# Patient Record
Sex: FEMALE | URBAN - METROPOLITAN AREA
[De-identification: names, ages, dates, MRNs, and addresses within clinical notes are randomized per-mention and may not be internally consistent; named-entity substitution may affect disease eponyms.]

---

## 2017-02-06 ENCOUNTER — HOSPITAL ENCOUNTER (OUTPATIENT)
Dept: LAB | Age: 82
Discharge: HOME | End: 2017-02-06
Payer: MEDICARE

## 2017-02-06 DIAGNOSIS — D50.9 IRON DEFICIENCY ANEMIA, UNSPECIFIED IRON DEFICIENCY ANEMIA TYPE: ICD-10-CM

## 2017-02-06 LAB
BASOPHILS # BLD AUTO: 0 K/UL (ref 0–0.06)
BASOPHILS # BLD: 0 % (ref 0–2)
DIFFERENTIAL METHOD BLD: ABNORMAL
EOSINOPHIL # BLD: 0.2 K/UL (ref 0–0.4)
EOSINOPHIL NFR BLD: 2 % (ref 0–5)
ERYTHROCYTE [DISTWIDTH] IN BLOOD BY AUTOMATED COUNT: 23.9 % (ref 11.6–14.5)
HCT VFR BLD AUTO: 38.8 % (ref 35–45)
HGB BLD-MCNC: 11.7 G/DL (ref 12–16)
LYMPHOCYTES # BLD AUTO: 16 % (ref 21–52)
LYMPHOCYTES # BLD: 1.6 K/UL (ref 0.9–3.6)
MCH RBC QN AUTO: 24 PG (ref 24–34)
MCHC RBC AUTO-ENTMCNC: 30.2 G/DL (ref 31–37)
MCV RBC AUTO: 79.7 FL (ref 74–97)
MONOCYTES # BLD: 0.9 K/UL (ref 0.05–1.2)
MONOCYTES NFR BLD AUTO: 9 % (ref 3–10)
NEUTS SEG # BLD: 7.2 K/UL (ref 1.8–8)
NEUTS SEG NFR BLD AUTO: 73 % (ref 40–73)
PLATELET # BLD AUTO: 267 K/UL (ref 135–420)
RBC # BLD AUTO: 4.87 M/UL (ref 4.2–5.3)
WBC # BLD AUTO: 9.9 K/UL (ref 4.6–13.2)

## 2017-02-06 PROCEDURE — 36415 COLL VENOUS BLD VENIPUNCTURE: CPT | Performed by: INTERNAL MEDICINE

## 2017-02-06 PROCEDURE — 85025 COMPLETE CBC W/AUTO DIFF WBC: CPT | Performed by: INTERNAL MEDICINE

## 2017-02-07 NOTE — PROGRESS NOTES
Spoke with patient and gave resulted note. Patient stated she is going good  Sleeping good and eating better.

## 2017-05-01 RX ORDER — DILTIAZEM HYDROCHLORIDE 240 MG/1
CAPSULE, EXTENDED RELEASE ORAL
Qty: 90 CAP | Refills: 2 | Status: SHIPPED | OUTPATIENT
Start: 2017-05-01 | End: 2017-10-16 | Stop reason: SDUPTHER

## 2017-07-10 ENCOUNTER — OFFICE VISIT (OUTPATIENT)
Dept: FAMILY MEDICINE CLINIC | Age: 82
End: 2017-07-10

## 2017-07-10 VITALS
TEMPERATURE: 97.8 F | BODY MASS INDEX: 28.32 KG/M2 | HEART RATE: 82 BPM | SYSTOLIC BLOOD PRESSURE: 132 MMHG | HEIGHT: 65 IN | OXYGEN SATURATION: 94 % | WEIGHT: 170 LBS | RESPIRATION RATE: 18 BRPM | DIASTOLIC BLOOD PRESSURE: 48 MMHG

## 2017-07-10 DIAGNOSIS — E03.9 ACQUIRED HYPOTHYROIDISM: ICD-10-CM

## 2017-07-10 DIAGNOSIS — I10 ESSENTIAL HYPERTENSION: ICD-10-CM

## 2017-07-10 DIAGNOSIS — J45.909 ASTHMATIC BRONCHITIS, UNSPECIFIED ASTHMA SEVERITY, UNCOMPLICATED: ICD-10-CM

## 2017-07-10 DIAGNOSIS — D50.9 IRON DEFICIENCY ANEMIA, UNSPECIFIED IRON DEFICIENCY ANEMIA TYPE: Primary | ICD-10-CM

## 2017-07-10 LAB — HGB BLD-MCNC: 9.8 G/DL

## 2017-07-10 RX ORDER — DEXAMETHASONE SODIUM PHOSPHATE 4 MG/ML
4 INJECTION, SOLUTION INTRA-ARTICULAR; INTRALESIONAL; INTRAMUSCULAR; INTRAVENOUS; SOFT TISSUE ONCE
Qty: 1 VIAL | Refills: 0
Start: 2017-07-10 | End: 2017-07-10

## 2017-07-10 RX ORDER — AZITHROMYCIN 250 MG/1
TABLET, FILM COATED ORAL
Qty: 6 TAB | Refills: 0 | Status: SHIPPED | OUTPATIENT
Start: 2017-07-10 | End: 2017-10-16 | Stop reason: ALTCHOICE

## 2017-07-10 NOTE — PROGRESS NOTES
Per verbal orders of , injection of dexamethasone 4mg given by Mango Calhoun LPN. Patient instructed to remain in clinic for 20 minutes afterwards, and to report any adverse reaction to me immediately. Medication documentation completed. Tolerated well.

## 2017-07-10 NOTE — PROGRESS NOTES
Chief Complaint   Patient presents with    Cough    Anemia     follow up    Hypertension     follow up     Pain scale 0/10        1. Have you been to the ER, urgent care clinic since your last visit? Hospitalized since your last visit? No    2. Have you seen or consulted any other health care providers outside of the [address] [place] [address] since your last visit? Include any pap smears or colon screening.  No

## 2017-07-10 NOTE — MR AVS SNAPSHOT
Visit Information Date & Time Provider Department Dept. Phone Encounter #  
 7/10/2017 10:30 AM Alcira Mamta, 3 [facility] [phone] [id number] Follow-up Instructions Return in about 3 months (around 10/10/2017). Upcoming Health Maintenance Date Due DTaP/Tdap/Td series (1 - Tdap) 4/23/1944 MEDICARE YEARLY EXAM 8/10/2016 GLAUCOMA SCREENING Q2Y 12/9/2016 INFLUENZA AGE 9 TO ADULT 8/1/2017 Allergies as of 7/10/2017  Review Complete On: 7/10/2017 By: Alcira Oleary MD  
 No Known Allergies Current Immunizations  Never Reviewed No immunizations on file. Not reviewed this visit You Were Diagnosed With   
  
 Codes Comments Iron deficiency anemia, unspecified iron deficiency anemia type    -  Primary ICD-10-CM: D50.9 ICD-9-CM: 280.9 Asthmatic bronchitis, unspecified asthma severity, uncomplicated     SGO-61-PC: J45.909 ICD-9-CM: 493.90 Acquired hypothyroidism     ICD-10-CM: E03.9 ICD-9-CM: 244.9 Essential hypertension     ICD-10-CM: I10 
ICD-9-CM: 401.9 Vitals BP Pulse Temp Resp Height(growth percentile) Weight(growth percentile) 132/48 (BP 1 Location: Left arm, BP Patient Position: Sitting) 82 97.8 °F (36.6 °C) (Oral) 18 5' 5\" (1.651 m) 170 lb (77.1 kg) SpO2 BMI OB Status 94% 28.29 kg/m2 Postmenopausal     
  
BMI and BSA Data Body Mass Index Body Surface Area  
 28.29 kg/m 2 1.88 m 2 Preferred Pharmacy Pharmacy Name Phone [facility], [address] [place] [address] [phone] Your Updated Medication List  
  
   
This list is accurate as of: 7/10/17 10:55 AM.  Always use your most recent med list.  
  
  
  
  
 aspirin 81 mg chewable tablet Take 81 mg by mouth daily. azithromycin 250 mg tablet Commonly known as:  Neal Gist Take as directed  
  
 dexamethasone 4 mg/mL injection Commonly known as:  DECADRON  
 1 mL by IntraMUSCular route once for 1 dose. * dilTIAZem  mg Tb24 tablet Commonly known as:  CARDIZEM LA Take 1 Tab by mouth daily. * dilTIAZem  mg XR capsule Commonly known as:  DILACOR XR  
TAKE ONE CAPSULE BY MOUTH ONE TIME DAILY ferrous sulfate, dried 159 mg (45 mg iron) Tber tablet Commonly known as:  SLOW RELEASE IRON Take 1 Tab by mouth daily. guaiFENesin-dextromethorphan -30 mg per tablet Commonly known as:  Nick & Nick DM Take 1 Tab by mouth two (2) times a day. levothyroxine 100 mcg tablet Commonly known as:  SYNTHROID Take 1 Tab by mouth Daily (before breakfast). Brand only * OTHER Shoe lift,right foot * OTHER Gel mattress overlay PERCOGESIC 12.5-325 mg Tab Generic drug:  diphenhydrAMINE-Acetaminophen Take  by mouth. rosuvastatin 5 mg tablet Commonly known as:  CRESTOR Take 1 Tab by mouth nightly. senna 8.6 mg tablet Commonly known as:  Senna Once or twice daily with 8 ounces liquid  
  
 silver sulfADIAZINE 1 % topical cream  
Commonly known as:  SILVADENE Apply  to affected area two (2) times a day. VITAMIN D3 1,000 unit tablet Generic drug:  cholecalciferol Take  by mouth daily. * Notice: This list has 4 medication(s) that are the same as other medications prescribed for you. Read the directions carefully, and ask your doctor or other care provider to review them with you. Prescriptions Sent to Pharmacy Refills  
 azithromycin (ZITHROMAX) 250 mg tablet 0 Sig: Take as directed Class: Normal  
 Pharmacy: [address], [address] [place] [address] Ph #: [phone] We Performed the Following AMB POC HEMOGLOBIN (HGB) [44722 CPT(R)] DEXAMETHASONE SODIUM PHOSPHATE INJECTION 1 MG [ [facility]] CT THER/PROPH/DIAG INJECTION, SUBCUT/IM [id number] CPT(R)] REFERRAL TO [facility] PROGRAMS [NRN957 Custom] Comments: patient has been referred into the [facility] Programs indicated above. She is currently being managed for the following chronic conditions: has Nonspecific abnormal findings on radiological and examination of gastrointestinal tract, Nonspecific (abnormal) findings on radiological and other examination of biliary tract, Disorder of adrenal gland ([address] Backache, Cholelithiasis, Abnormal coagulation profile, Fever, Hepatomegaly, Abnormal glucose, Essential hypertension, Hypothyroidism, Leukocytosis, and Systemic inflammatory response syndrome (SIRS) (HCC) on her problem list.  
  
Follow-up Instructions Return in about 3 months (around 10/10/2017). Referral Information Referral ID Referred By Referred To  
  
 [id number] Neil Lynch Not Available Visits Status Start Date End Date 1 New Request 7/10/17 7/10/18 If your referral has a status of pending review or denied, additional information will be sent to support the outcome of this decision. Introducing [facility] & HEALTH SERVICES! [employer] introduces BURLES[employer]ICEOUS patient portal. Now you can access parts of your medical record, email your doctor's office, and request medication refills online. 1. In your internet browser, go to [email or web address] 2. Click on the First Time User? Click Here link in the Sign In box. You will see the New Member Sign Up page. 3. Enter your BURLESQUICEOUS Access Code exactly as it appears below. You will not need to use this code after youve completed the sign-up process. If you do not sign up before the expiration date, you must request a new code. · [employer]URL[employer] Access Code: [email or web address] Expires: 10/8/2017 10:55 AM 
 
4. Enter the last four digits of your Social Security Number (xxxx) and Date of Birth (mm/dd/yyyy) as indicated and click Submit. You will be taken to the next sign-up page. 5. Create a BURLESQUICEOUS ID.  This will be your BURLESQUICEOUS login ID and cannot be changed, so think of one that is secure and easy to remember. 6. Create a [employer] password. You can change your password at any time. 7. Enter your Password Reset Question and Answer. This can be used at a later time if you forget your password. 8. Enter your e-mail address. You will receive e-mail notification when new information is available in [address] 9. Click Sign Up. You can now view and download portions of your medical record. 10. Click the Download Summary menu link to download a portable copy of your medical information. If you have questions, please visit the Frequently Asked Questions section of the [employer] website. Remember, [employer] is NOT to be used for urgent needs. For medical emergencies, dial 911. Now available from your iPhone and Android! Please provide this summary of care documentation to your next provider. Your primary care clinician is listed as Yuri Watts. If you have any questions after today's visit, please call [phone].

## 2017-10-16 ENCOUNTER — OFFICE VISIT (OUTPATIENT)
Dept: FAMILY MEDICINE CLINIC | Age: 82
End: 2017-10-16

## 2017-10-16 VITALS
WEIGHT: 170 LBS | TEMPERATURE: 97 F | DIASTOLIC BLOOD PRESSURE: 67 MMHG | HEIGHT: 65 IN | SYSTOLIC BLOOD PRESSURE: 133 MMHG | HEART RATE: 74 BPM | BODY MASS INDEX: 28.32 KG/M2 | OXYGEN SATURATION: 98 % | RESPIRATION RATE: 18 BRPM

## 2017-10-16 DIAGNOSIS — Z00.00 ROUTINE GENERAL MEDICAL EXAMINATION AT A HEALTH CARE FACILITY: ICD-10-CM

## 2017-10-16 DIAGNOSIS — D64.9 CHRONIC ANEMIA: ICD-10-CM

## 2017-10-16 DIAGNOSIS — E03.9 ACQUIRED HYPOTHYROIDISM: ICD-10-CM

## 2017-10-16 DIAGNOSIS — R63.4 WEIGHT LOSS: ICD-10-CM

## 2017-10-16 DIAGNOSIS — E78.5 HYPERLIPIDEMIA, UNSPECIFIED HYPERLIPIDEMIA TYPE: Primary | ICD-10-CM

## 2017-10-16 RX ORDER — LEVOTHYROXINE SODIUM 100 UG/1
100 TABLET ORAL
Qty: 90 TAB | Refills: 3 | Status: SHIPPED | OUTPATIENT
Start: 2017-10-16 | End: 2018-12-31 | Stop reason: SDUPTHER

## 2017-10-16 RX ORDER — ROSUVASTATIN CALCIUM 5 MG/1
5 TABLET, COATED ORAL
Qty: 90 TAB | Refills: 3 | Status: SHIPPED | OUTPATIENT
Start: 2017-10-16 | End: 2019-01-01 | Stop reason: SDUPTHER

## 2017-10-16 RX ORDER — DILTIAZEM HYDROCHLORIDE 240 MG/1
240 CAPSULE, EXTENDED RELEASE ORAL DAILY
Qty: 90 CAP | Refills: 3 | Status: SHIPPED | OUTPATIENT
Start: 2017-10-16 | End: 2018-11-02 | Stop reason: ALTCHOICE

## 2017-10-16 NOTE — PROGRESS NOTES
Santa Zepeda is a 80 y.o. female (: 1923) presenting to address:[facility]    Chief Complaint   Patient presents with    Annual Wellness Visit            Vitals:    10/16/17 0946   BP: 133/67   Pulse: 74   Resp: 18   Temp: 97 °F (36.1 °C)   TempSrc: Oral   SpO2: 98%   Weight: 170 lb (77.1 kg)   Height: 5' 5\" (1.651 m)   PainSc:   0 - No pain       Hearing/Vision:   No exam data present    Learning Assessment:     Learning Assessment 10/3/2014   PRIMARY LEARNER Patient   HIGHEST LEVEL OF EDUCATION - PRIMARY LEARNER  GRADUATED HIGH SCHOOL OR GED   BARRIERS PRIMARY LEARNER NONE   PRIMARY LANGUAGE ENGLISH   LEARNER PREFERENCE PRIMARY DEMONSTRATION   ANSWERED BY PATIENT   RELATIONSHIP OTHER     Depression Screening:     PHQ over the last two weeks 10/16/2017   Little interest or pleasure in doing things Not at all   Feeling down, depressed or hopeless Not at all   Total Score PHQ 2 0     Fall Risk Assessment:     Fall Risk Assessment, last 12 mths 10/16/2017   Able to walk? Yes   Fall in past 12 months? No     Abuse Screening:     Abuse Screening Questionnaire 10/16/2017   Do you ever feel afraid of your partner? N   Are you in a relationship with someone who physically or mentally threatens you? N   Is it safe for you to go home? Y     Coordination of Care Questionaire:   1. Have you been to the ER, urgent care clinic since your last visit? Hospitalized since your last visit? no    2. Have you seen or consulted any other health care providers outside of the [address] [place] [address] since your last visit? Include any pap smears or colon screening. no    Advanced Directive:   1. Do you have an Advanced Directive? no    2. Would you like information on Advanced Directives? No    Patient declined flu vaccine.

## 2017-10-16 NOTE — ACP (ADVANCE CARE PLANNING)
Advance Care Planning    Advance Care Planning (ACP) Provider Conversation Snapshot    Date of ACP Conversation: 10/16/17  Persons included in Conversation:  patient and family  Length of ACP Conversation in minutes:  25 minutes    Authorized Decision Maker (if patient is incapable of making informed decisions):    This person is:   Healthcare Agent/Medical Power of  under Advance Directive          For Patients with Decision Making Capacity:   Values/Goals: Exploration of values, goals, and preferences if recovery is not expected, even with continued medical treatment in the event of:  Imminent death  Severe, permanent brain injury    Conversation Outcomes / Follow-Up Plan:   Recommended completion of Advance Directive form after review of ACP materials and conversation with prospective healthcare agent    ACP reviewed  Info given  ~ 18 minutes

## 2017-10-16 NOTE — MR AVS SNAPSHOT
Visit Information Date & Time Provider Department Dept. Phone Encounter #  
 10/16/2017  9:30 AM Reena Inge, 3 [facility] [phone] [id number] Follow-up Instructions Return in about 6 months (around 4/16/2018). Follow-up and Disposition History Upcoming Health Maintenance Date Due  
 MEDICARE YEARLY EXAM 10/17/2018 GLAUCOMA SCREENING Q2Y 10/16/2019 DTaP/Tdap/Td series (2 - Td) 10/16/2027 Allergies as of 10/16/2017  Review Complete On: 10/16/2017 By: Reena Alcazar MD  
 No Known Allergies Current Immunizations  Never Reviewed No immunizations on file. Not reviewed this visit You Were Diagnosed With   
  
 Codes Comments Hyperlipidemia, unspecified hyperlipidemia type    -  Primary ICD-10-CM: E78.5 ICD-9-CM: 272.4 Acquired hypothyroidism     ICD-10-CM: E03.9 ICD-9-CM: 451. 9 Chronic anemia     ICD-10-CM: D64.9 ICD-9-CM: 285.9 Weight loss     ICD-10-CM: R63.4 ICD-9-CM: 783.21 Routine general medical examination at a health care facility     ICD-10-CM: Z00.00 ICD-9-CM: V70.0 Vitals BP Pulse Temp Resp Height(growth percentile) Weight(growth percentile) 133/67 (BP 1 Location: Left arm, BP Patient Position: Sitting) 74 97 °F (36.1 °C) (Oral) 18 5' 5\" (1.651 m) 170 lb (77.1 kg) SpO2 BMI OB Status Smoking Status 98% 28.29 kg/m2 Postmenopausal Never Smoker BMI and BSA Data Body Mass Index Body Surface Area  
 28.29 kg/m 2 1.88 m 2 Preferred Pharmacy [facility] Name Phone [facility], [address] [place] [address] [phone] Your Updated Medication List  
  
   
This list is accurate as of: 10/16/17 10:15 AM.  Always use your most recent med list.  
  
  
  
  
 aspirin 81 mg chewable tablet Take 81 mg by mouth daily. * dilTIAZem  mg Tb24 tablet Commonly known as:  CARDIZEM LA Take 1 Tab by mouth daily. * dilTIAZem  mg XR capsule Commonly known as:  DILACOR XR Take 1 Cap by mouth daily. ferrous sulfate, dried 159 mg (45 mg iron) Tber tablet Commonly known as:  SLOW RELEASE IRON Take 1 Tab by mouth daily. guaiFENesin-dextromethorphan -30 mg per tablet Commonly known as:  [address] DM Take 1 Tab by mouth two (2) times a day. levothyroxine 100 mcg tablet Commonly known as:  SYNTHROID Take 1 Tab by mouth Daily (before breakfast). Brand only * OTHER Shoe lift,right foot * OTHER Gel mattress overlay PERCOGESIC 12.5-325 mg Tab Generic drug:  diphenhydrAMINE-Acetaminophen Take  by mouth. rosuvastatin 5 mg tablet Commonly known as:  CRESTOR Take 1 Tab by mouth nightly. senna 8.6 mg tablet Commonly known as:  Senna Once or twice daily with 8 ounces liquid  
  
 silver sulfADIAZINE 1 % topical cream  
Commonly known as:  SILVADENE Apply  to affected area two (2) times a day. VITAMIN D3 1,000 unit tablet Generic drug:  cholecalciferol Take  by mouth daily. * Notice: This list has 4 medication(s) that are the same as other medications prescribed for you. Read the directions carefully, and ask your doctor or other care provider to review them with you. Prescriptions Sent to Pharmacy Refills  
 levothyroxine (SYNTHROID) 100 mcg tablet 3 Sig: Take 1 Tab by mouth Daily (before breakfast). Brand only Class: Normal  
 Pharmacy: [address] Ph #: [phone] Route: Oral  
 rosuvastatin (CRESTOR) 5 mg tablet 3 Sig: Take 1 Tab by mouth nightly. Class: Normal  
 Pharmacy: [address] Ph #: [phone] Route: Oral  
 dilTIAZem XR (DILACOR XR) 240 mg XR capsule 3 Sig: Take 1 Cap by mouth daily. Class: Normal  
 Pharmacy: [address] Ph #: [phone] Route: Oral  
  
Follow-up Instructions Return in about 6 months (around 4/16/2018). To-Do List   
 10/16/2017 Lab:  CBC WITH AUTOMATED DIFF   
  
 10/16/2017 Lab:  LIPID PANEL   
  
 10/16/2017 Lab:  METABOLIC PANEL, COMPREHENSIVE   
  
 10/16/2017 Lab:  T4, FREE   
  
 10/16/2017 Lab:  TSH 3RD GENERATION Patient Instructions Medicare Wellness Visit, Female The best way to live healthy is to have a healthy lifestyle by eating a well-balanced diet, exercising regularly, limiting alcohol and stopping smoking. Regular physical exams and screening tests are another way to keep healthy. Preventive exams provided by your health care provider can find health problems before they become diseases or illnesses. Preventive services including immunizations, screening tests, monitoring and exams can help you take care of your own health. All people over age 72 should have a pneumovax  and and a prevnar shot to prevent pneumonia. These are once in a lifetime unless you and your provider decide differently. All people over 65 should have a yearly flu shot and a tetanus vaccine every 10 years. A bone mass density to screen for osteoporosis or thinning of the bones should be done every 2 years after 65. Screening for diabetes mellitus with a blood sugar test should be done every year. Glaucoma is a disease of the eye due to increased ocular pressure that can lead to blindness and it should be done every year by an eye professional. 
 
Cardiovascular screening tests that check for elevated lipids (fatty part of blood) which can lead to heart disease and strokes should be done every 5 years. Colorectal screening that evaluates for blood or polyps in your colon should be done yearly as a stool test or every five years as a flexible sigmoidoscope or every 10 years as a colonoscopy up to age 76.  
 
Breast cancer screening with a mammogram is recommended biennially  for women age 54-69. Screening for cervical cancer with a pap smear and pelvic exam is recommended for women after age 72 years every 2 years up to age 79 or when the provider and patient decide to stop. If there is a history of cervical abnormalities or other increased risk for cancer then the test is recommended yearly. Hepatitis C screening is also recommended for anyone born between 80 through [address]. A shingles vaccine is also recommended once in a lifetime after age 61. Your Medicare Wellness Exam is recommended annually. Here is a list of your current Health Maintenance items with a due date: There are no preventive care reminders to display for this patient. Introducing [facility] & HEALTH SERVICES! [employer] introduces [employer] patient portal. Now you can access parts of your medical record, email your doctor's office, and request medication refills online. 1. In your internet browser, go to [email or web address] 2. Click on the First Time User? Click Here link in the Sign In box. You will see the New Member Sign Up page. 3. Enter your LiveExercise Access Code exactly as it appears below. You will not need to use this code after youve completed the sign-up process. If you do not sign up before the expiration date, you must request a new code. · Live[employer] Access Code: [email or web address] Expires: 1/14/2018 10:14 AM 
 
4. Enter the last four digits of your Social Security Number (xxxx) and Date of Birth (mm/dd/yyyy) as indicated and click Submit. You will be taken to the next sign-up page. 5. Create a Shustirt ID. This will be your LiveExercise login ID and cannot be changed, so think of one that is secure and easy to remember. 6. Create a LiveEx[employer] password. You can change your password at any time. 7. Enter your Password Reset Question and Answer. This can be used at a later time if you forget your password. 8. Enter your e-mail address.  You will receive e-mail notification when new information is available in [employer]. 9. Click Sign Up. You can now view and download portions of your medical record. 10. Click the Download Summary menu link to download a portable copy of your medical information. If you have questions, please visit the Frequently Asked Questions section of the [employer] website. Remember, [employer] is NOT to be used for urgent needs. For medical emergencies, dial 911. Now available from your iPhone and Android! Please provide this summary of care documentation to your next provider. Your primary care clinician is listed as Viktoria Quinones. If you have any questions after today's visit, please call [phone].

## 2017-10-16 NOTE — PROGRESS NOTES
HISTORY OF PRESENT ILLNESS  Maria D Smallwood is a 80 y.o. female. HPI  hbp stable  Anemia stable  C/o losing wgt  Review of Systems   Constitutional: Positive for weight loss. All other systems reviewed and are negative. Past Medical History:   Diagnosis Date    Hypercholesterolemia     Hypertension     Thyroid disease      Current Outpatient Prescriptions on File Prior to Visit   Medication Sig Dispense Refill    levothyroxine (SYNTHROID) 100 mcg tablet Take 1 Tab by mouth Daily (before breakfast). Brand only 90 Tab 3    rosuvastatin (CRESTOR) 5 mg tablet Take 1 Tab by mouth nightly. 90 Tab 3    ferrous sulfate, dried (SLOW RELEASE IRON) 159 mg (45 mg iron) TbER tablet Take 1 Tab by mouth daily. 100 Tab 0    senna (SENNA) 8.6 mg tablet Once or twice daily with 8 ounces liquid 100 Tab 3    OTHER Gel mattress overlay 1 Each 0    cholecalciferol (VITAMIN D3) 1,000 unit tablet Take  by mouth daily.  aspirin 81 mg chewable tablet Take 81 mg by mouth daily.  diltiazem hcl 240 mg Tb24 Take 1 Tab by mouth daily. 90 Tab 3    OTHER Shoe lift,right foot 1 Device 0    guaiFENesin-dextromethorphan SR (MUCINEX DM) 600-30 mg per tablet Take 1 Tab by mouth two (2) times a day. 20 Tab 0    dilTIAZem XR (DILACOR XR) 240 mg XR capsule TAKE ONE CAPSULE BY MOUTH ONE TIME DAILY 90 Cap 2    silver sulfADIAZINE (SILVADENE) 1 % topical cream Apply  to affected area two (2) times a day. 50 g 1    Diphenhydramine-Acetaminophen (PERCOGESIC) 12.5-325 mg Tab Take  by mouth. No current facility-administered medications on file prior to visit. Visit Vitals    /67 (BP 1 Location: Left arm, BP Patient Position: Sitting)    Pulse 74    Temp 97 °F (36.1 °C) (Oral)    Resp 18    Ht 5' 5\" (1.651 m)    Wt 170 lb (77.1 kg)    SpO2 98%    BMI 28.29 kg/m2         Physical Exam   Constitutional: She appears well-developed and well-nourished. No distress.    Cardiovascular: Normal rate, regular rhythm and intact distal pulses. Exam reveals no gallop and no friction rub. Murmur heard. Skin: Skin is warm and dry. No rash noted. She is not diaphoretic. No erythema. No pallor. Vitals reviewed. ASSESSMENT and PLAN  hbp   Hypothyroidism  Anemia  Plan  Labs  Recheck in 6 months  This is a Subsequent Medicare Annual Wellness Exam (AWV) (Performed 12 months after IPPE or effective date of Medicare Part B enrollment, Once in a lifetime)    I have reviewed the patient's medical history in detail and updated the computerized patient record. History     Past Medical History:   Diagnosis Date    Hypercholesterolemia     Hypertension     Thyroid disease       History reviewed. No pertinent surgical history. Current Outpatient Prescriptions   Medication Sig Dispense Refill    levothyroxine (SYNTHROID) 100 mcg tablet Take 1 Tab by mouth Daily (before breakfast). Brand only 90 Tab 3    rosuvastatin (CRESTOR) 5 mg tablet Take 1 Tab by mouth nightly. 90 Tab 3    ferrous sulfate, dried (SLOW RELEASE IRON) 159 mg (45 mg iron) TbER tablet Take 1 Tab by mouth daily. 100 Tab 0    senna (SENNA) 8.6 mg tablet Once or twice daily with 8 ounces liquid 100 Tab 3    OTHER Gel mattress overlay 1 Each 0    cholecalciferol (VITAMIN D3) 1,000 unit tablet Take  by mouth daily.  aspirin 81 mg chewable tablet Take 81 mg by mouth daily.  diltiazem hcl 240 mg Tb24 Take 1 Tab by mouth daily. 90 Tab 3    OTHER Shoe lift,right foot 1 Device 0    guaiFENesin-dextromethorphan SR (MUCINEX DM) 600-30 mg per tablet Take 1 Tab by mouth two (2) times a day. 20 Tab 0    dilTIAZem XR (DILACOR XR) 240 mg XR capsule TAKE ONE CAPSULE BY MOUTH ONE TIME DAILY 90 Cap 2    silver sulfADIAZINE (SILVADENE) 1 % topical cream Apply  to affected area two (2) times a day. 50 g 1    Diphenhydramine-Acetaminophen (PERCOGESIC) 12.5-325 mg Tab Take  by mouth. No Known Allergies  History reviewed. No pertinent family history.   Social History   Substance Use Topics    Smoking status: Never Smoker    Smokeless tobacco: Never Used    Alcohol use No     Patient Active Problem List   Diagnosis Code    Nonspecific abnormal findings on radiological and examination of gastrointestinal tract R93.3    Nonspecific (abnormal) findings on radiological and other examination of biliary tract R93.2    Disorder of adrenal gland ([facility] [address] E27.9    Backache M54.9    Cholelithiasis K80.20    Abnormal coagulation profile R79.1    Fever R50.9    Hepatomegaly R16.0    Abnormal glucose R73.09    Essential hypertension I10    Hypothyroidism E03.9    Leukocytosis D72.829    Systemic inflammatory response syndrome (SIRS) (HCC) R65.10       Depression Risk Factor Screening:     PHQ over the last two weeks 10/16/2017   Little interest or pleasure in doing things Not at all   Feeling down, depressed or hopeless Not at all   Total Score PHQ 2 0     Alcohol Risk Factor Screening: You do not drink alcohol or very rarely. Functional Ability and Level of Safety:   Hearing Loss  Hearing is good. Activities of Daily Living  The home contains: handrails  Patient needs help with:  transportation, shopping, preparing meals, laundry, housework and bathing    Fall RiskFall Risk Assessment, last 12 mths 10/16/2017   Able to walk? Yes   Fall in past 12 months?  No       Abuse Screen  Patient is not abused    Cognitive Screening   Evaluation of Cognitive Function:  Has your family/caregiver stated any concerns about your memory: no  Normal    Patient Care Team   Patient Care Team:  Rajendra Rodriguez MD as PCP - General    Assessment/Plan   Education and counseling provided:  Are appropriate based on today's review and evaluation  End-of-Life planning (with patient's consent)  Pneumococcal Vaccine  Influenza Vaccine  Screening Mammography  Screening Pap and pelvic (covered once every 2 years)  Colorectal cancer screening tests  Bone mass measurement (DEXA)  Screening for glaucoma    Diagnoses and all orders for this visit:    1. Hyperlipidemia, unspecified hyperlipidemia type  -     CBC WITH AUTOMATED DIFF; Future  -     METABOLIC PANEL, COMPREHENSIVE; Future  -     LIPID PANEL; Future  -     TSH 3RD GENERATION; Future  -     T4, FREE; Future    2. Acquired hypothyroidism    3. Chronic anemia    4. Weight loss        There are no preventive care reminders to display for this patient.

## 2017-12-04 ENCOUNTER — HOSPITAL ENCOUNTER (OUTPATIENT)
Dept: LAB | Age: 82
Discharge: HOME | End: 2017-12-04
Payer: MEDICARE

## 2017-12-04 DIAGNOSIS — E78.5 HYPERLIPIDEMIA, UNSPECIFIED HYPERLIPIDEMIA TYPE: ICD-10-CM

## 2017-12-04 LAB
ALBUMIN SERPL-MCNC: 3.2 G/DL (ref 3.4–5)
ALBUMIN/GLOB SERPL: 0.8 {RATIO} (ref 0.8–1.7)
ALP SERPL-CCNC: 97 U/L (ref 45–117)
ALT SERPL-CCNC: 18 U/L (ref 13–56)
ANION GAP SERPL CALC-SCNC: 9 MMOL/L (ref 3–18)
AST SERPL-CCNC: 15 U/L (ref 15–37)
BASOPHILS # BLD: 0.1 K/UL (ref 0–0.06)
BASOPHILS NFR BLD: 1 % (ref 0–2)
BILIRUB SERPL-MCNC: 0.9 MG/DL (ref 0.2–1)
BUN SERPL-MCNC: 23 MG/DL (ref 7–18)
BUN/CREAT SERPL: 18 (ref 12–20)
CALCIUM SERPL-MCNC: 8.8 MG/DL (ref 8.5–10.1)
CHLORIDE SERPL-SCNC: 109 MMOL/L (ref 100–108)
CHOLEST SERPL-MCNC: 111 MG/DL
CO2 SERPL-SCNC: 24 MMOL/L (ref 21–32)
CREAT SERPL-MCNC: 1.31 MG/DL (ref 0.6–1.3)
DIFFERENTIAL METHOD BLD: ABNORMAL
EOSINOPHIL # BLD: 0.2 K/UL (ref 0–0.4)
EOSINOPHIL NFR BLD: 3 % (ref 0–5)
ERYTHROCYTE [DISTWIDTH] IN BLOOD BY AUTOMATED COUNT: 16.2 % (ref 11.6–14.5)
GLOBULIN SER CALC-MCNC: 4.1 G/DL (ref 2–4)
GLUCOSE SERPL-MCNC: 174 MG/DL (ref 74–99)
HCT VFR BLD AUTO: 35.6 % (ref 35–45)
HDLC SERPL-MCNC: 65 MG/DL (ref 40–60)
HDLC SERPL: 1.7 {RATIO} (ref 0–5)
HGB BLD-MCNC: 11.5 G/DL (ref 12–16)
LDLC SERPL CALC-MCNC: 27.4 MG/DL (ref 0–100)
LIPID PROFILE,FLP: ABNORMAL
LYMPHOCYTES # BLD: 1.6 K/UL (ref 0.9–3.6)
LYMPHOCYTES NFR BLD: 20 % (ref 21–52)
MCH RBC QN AUTO: 28.1 PG (ref 24–34)
MCHC RBC AUTO-ENTMCNC: 32.3 G/DL (ref 31–37)
MCV RBC AUTO: 87 FL (ref 74–97)
MONOCYTES # BLD: 0.8 K/UL (ref 0.05–1.2)
MONOCYTES NFR BLD: 10 % (ref 3–10)
NEUTS SEG # BLD: 5.4 K/UL (ref 1.8–8)
NEUTS SEG NFR BLD: 66 % (ref 40–73)
PLATELET # BLD AUTO: 228 K/UL (ref 135–420)
PMV BLD AUTO: 11.4 FL (ref 9.2–11.8)
POTASSIUM SERPL-SCNC: 4 MMOL/L (ref 3.5–5.5)
PROT SERPL-MCNC: 7.3 G/DL (ref 6.4–8.2)
RBC # BLD AUTO: 4.09 M/UL (ref 4.2–5.3)
SODIUM SERPL-SCNC: 142 MMOL/L (ref 136–145)
T4 FREE SERPL-MCNC: 1.5 NG/DL (ref 0.7–1.5)
TRIGL SERPL-MCNC: 93 MG/DL (ref ?–150)
TSH SERPL DL<=0.05 MIU/L-ACNC: 0.83 UIU/ML (ref 0.36–3.74)
VLDLC SERPL CALC-MCNC: 18.6 MG/DL
WBC # BLD AUTO: 8.1 K/UL (ref 4.6–13.2)

## 2017-12-04 PROCEDURE — 80061 LIPID PANEL: CPT | Performed by: INTERNAL MEDICINE

## 2017-12-04 PROCEDURE — 36415 COLL VENOUS BLD VENIPUNCTURE: CPT | Performed by: INTERNAL MEDICINE

## 2017-12-04 PROCEDURE — 84439 ASSAY OF FREE THYROXINE: CPT | Performed by: INTERNAL MEDICINE

## 2017-12-04 PROCEDURE — 85025 COMPLETE CBC W/AUTO DIFF WBC: CPT | Performed by: INTERNAL MEDICINE

## 2017-12-04 PROCEDURE — 84443 ASSAY THYROID STIM HORMONE: CPT | Performed by: INTERNAL MEDICINE

## 2017-12-04 PROCEDURE — 80053 COMPREHEN METABOLIC PANEL: CPT | Performed by: INTERNAL MEDICINE

## 2017-12-06 ENCOUNTER — TELEPHONE (OUTPATIENT)
Dept: FAMILY MEDICINE CLINIC | Age: 82
End: 2017-12-06

## 2017-12-06 NOTE — TELEPHONE ENCOUNTER
Attempted to contact patient. Phone number in chart is \"not available at this time due to phone company policy\".

## 2018-10-03 RX ORDER — DILTIAZEM HYDROCHLORIDE 240 MG/1
CAPSULE, EXTENDED RELEASE ORAL
Qty: 90 CAP | Refills: 0 | OUTPATIENT
Start: 2018-10-03

## 2018-10-31 NOTE — TELEPHONE ENCOUNTER
Future Appointments   Date Time Provider Kristine Kirk   12/10/2018  8:30 AM Jeanine Blackburn MD [facility] [place]

## 2018-11-01 NOTE — TELEPHONE ENCOUNTER
Patient needs to have office visit moved up. Cannot be prescribed meds without being seen. Have tried to reach the son three times, rings \"due to telephone facility trouble\" cannot connect call.

## 2018-11-02 ENCOUNTER — HOSPITAL ENCOUNTER (OUTPATIENT)
Dept: LAB | Age: 83
Discharge: HOME | End: 2018-11-02
Payer: MEDICARE

## 2018-11-02 ENCOUNTER — OFFICE VISIT (OUTPATIENT)
Dept: FAMILY MEDICINE CLINIC | Age: 83
End: 2018-11-02

## 2018-11-02 VITALS
OXYGEN SATURATION: 96 % | HEART RATE: 98 BPM | TEMPERATURE: 98.4 F | BODY MASS INDEX: 28.29 KG/M2 | HEIGHT: 65 IN | DIASTOLIC BLOOD PRESSURE: 72 MMHG | RESPIRATION RATE: 20 BRPM | SYSTOLIC BLOOD PRESSURE: 140 MMHG

## 2018-11-02 DIAGNOSIS — E03.9 ACQUIRED HYPOTHYROIDISM: ICD-10-CM

## 2018-11-02 DIAGNOSIS — R68.89 OTHER GENERAL SYMPTOMS AND SIGNS: ICD-10-CM

## 2018-11-02 DIAGNOSIS — E11.9 TYPE 2 DIABETES MELLITUS WITHOUT COMPLICATION, WITHOUT LONG-TERM CURRENT USE OF INSULIN (HCC): ICD-10-CM

## 2018-11-02 DIAGNOSIS — D64.9 ANEMIA, UNSPECIFIED TYPE: ICD-10-CM

## 2018-11-02 DIAGNOSIS — I10 ESSENTIAL HYPERTENSION: Primary | ICD-10-CM

## 2018-11-02 DIAGNOSIS — I10 ESSENTIAL HYPERTENSION: ICD-10-CM

## 2018-11-02 DIAGNOSIS — I35.0 NONRHEUMATIC AORTIC VALVE STENOSIS: ICD-10-CM

## 2018-11-02 DIAGNOSIS — D35.02 ADENOMA OF LEFT ADRENAL GLAND: ICD-10-CM

## 2018-11-02 PROBLEM — E88.09 HYPOALBUMINEMIA: Status: ACTIVE | Noted: 2018-11-02

## 2018-11-02 PROBLEM — I44.0 FIRST DEGREE AV BLOCK: Status: ACTIVE | Noted: 2018-11-02

## 2018-11-02 PROBLEM — N18.30 CKD (CHRONIC KIDNEY DISEASE) STAGE 3, GFR 30-59 ML/MIN (HCC): Status: ACTIVE | Noted: 2018-11-02

## 2018-11-02 PROBLEM — R73.9 ELEVATED BLOOD SUGAR: Status: ACTIVE | Noted: 2018-11-02

## 2018-11-02 PROBLEM — D35.00 ADRENAL ADENOMA: Status: ACTIVE | Noted: 2018-11-02

## 2018-11-02 PROBLEM — D50.9 IRON DEFICIENCY ANEMIA: Status: ACTIVE | Noted: 2018-11-02

## 2018-11-02 LAB
ALBUMIN SERPL-MCNC: 3.1 G/DL (ref 3.4–5)
ALBUMIN/GLOB SERPL: 0.8 {RATIO} (ref 0.8–1.7)
ALP SERPL-CCNC: 86 U/L (ref 45–117)
ALT SERPL-CCNC: 21 U/L (ref 13–56)
ANION GAP SERPL CALC-SCNC: 8 MMOL/L (ref 3–18)
AST SERPL-CCNC: 16 U/L (ref 15–37)
BILIRUB SERPL-MCNC: 0.8 MG/DL (ref 0.2–1)
BUN SERPL-MCNC: 22 MG/DL (ref 7–18)
BUN/CREAT SERPL: 16 (ref 12–20)
CALCIUM SERPL-MCNC: 8.3 MG/DL (ref 8.5–10.1)
CHLORIDE SERPL-SCNC: 113 MMOL/L (ref 100–108)
CHOLEST SERPL-MCNC: 120 MG/DL
CO2 SERPL-SCNC: 22 MMOL/L (ref 21–32)
CREAT SERPL-MCNC: 1.34 MG/DL (ref 0.6–1.3)
ERYTHROCYTE [DISTWIDTH] IN BLOOD BY AUTOMATED COUNT: 15.1 % (ref 11.6–14.5)
FERRITIN SERPL-MCNC: 41 NG/ML (ref 8–388)
GLOBULIN SER CALC-MCNC: 3.9 G/DL (ref 2–4)
GLUCOSE SERPL-MCNC: 170 MG/DL (ref 74–99)
HBA1C MFR BLD: 8.5 % (ref 4.2–5.6)
HCT VFR BLD AUTO: 40.4 % (ref 35–45)
HDLC SERPL-MCNC: 62 MG/DL (ref 40–60)
HDLC SERPL: 1.9 {RATIO} (ref 0–5)
HGB BLD-MCNC: 12.5 G/DL (ref 12–16)
IRON SERPL-MCNC: 40 UG/DL (ref 50–175)
LDLC SERPL CALC-MCNC: 35.2 MG/DL (ref 0–100)
LIPID PROFILE,FLP: ABNORMAL
MCH RBC QN AUTO: 28 PG (ref 24–34)
MCHC RBC AUTO-ENTMCNC: 30.9 G/DL (ref 31–37)
MCV RBC AUTO: 90.4 FL (ref 74–97)
PLATELET # BLD AUTO: 218 K/UL (ref 135–420)
PMV BLD AUTO: 12 FL (ref 9.2–11.8)
POTASSIUM SERPL-SCNC: 4 MMOL/L (ref 3.5–5.5)
PROT SERPL-MCNC: 7 G/DL (ref 6.4–8.2)
RBC # BLD AUTO: 4.47 M/UL (ref 4.2–5.3)
SODIUM SERPL-SCNC: 143 MMOL/L (ref 136–145)
TRIGL SERPL-MCNC: 114 MG/DL (ref ?–150)
TSH SERPL DL<=0.05 MIU/L-ACNC: 0.38 UIU/ML (ref 0.36–3.74)
VIT B12 SERPL-MCNC: 382 PG/ML (ref 211–911)
VLDLC SERPL CALC-MCNC: 22.8 MG/DL
WBC # BLD AUTO: 9.3 K/UL (ref 4.6–13.2)

## 2018-11-02 PROCEDURE — 36415 COLL VENOUS BLD VENIPUNCTURE: CPT | Performed by: INTERNAL MEDICINE

## 2018-11-02 PROCEDURE — 80061 LIPID PANEL: CPT | Performed by: INTERNAL MEDICINE

## 2018-11-02 PROCEDURE — 84443 ASSAY THYROID STIM HORMONE: CPT | Performed by: INTERNAL MEDICINE

## 2018-11-02 PROCEDURE — 83540 ASSAY OF IRON: CPT | Performed by: INTERNAL MEDICINE

## 2018-11-02 PROCEDURE — 82728 ASSAY OF FERRITIN: CPT | Performed by: INTERNAL MEDICINE

## 2018-11-02 PROCEDURE — 85027 COMPLETE CBC AUTOMATED: CPT | Performed by: INTERNAL MEDICINE

## 2018-11-02 PROCEDURE — 83036 HEMOGLOBIN GLYCOSYLATED A1C: CPT | Performed by: INTERNAL MEDICINE

## 2018-11-02 PROCEDURE — 82607 VITAMIN B-12: CPT | Performed by: INTERNAL MEDICINE

## 2018-11-02 PROCEDURE — 80053 COMPREHEN METABOLIC PANEL: CPT | Performed by: INTERNAL MEDICINE

## 2018-11-02 RX ORDER — DILTIAZEM HYDROCHLORIDE 240 MG/1
CAPSULE, EXTENDED RELEASE ORAL
Qty: 90 CAP | Refills: 0 | OUTPATIENT
Start: 2018-11-02

## 2018-11-02 RX ORDER — DILTIAZEM HYDROCHLORIDE EXTENDED-RELEASE TABLETS 240 MG/1
240 TABLET, EXTENDED RELEASE ORAL DAILY
Qty: 90 TAB | Refills: 3 | Status: SHIPPED | OUTPATIENT
Start: 2018-11-02 | End: 2019-01-01 | Stop reason: SDUPTHER

## 2018-11-02 NOTE — PROGRESS NOTES
Blair Whiting is a 80 y.o. female (: 1923) presenting to address: Chief Complaint Patient presents with Carter Establish Care Vitals:  
 18 [phone] BP: 140/72 Pulse: 98 Resp: 20 Temp: 98.4 °F (36.9 °C) TempSrc: Oral  
SpO2: 96% Height: 5' 5\" (1.651 m) PainSc:   0 - No pain Learning Assessment:  
 
Learning Assessment 10/3/2014 PRIMARY LEARNER Patient HIGHEST LEVEL OF EDUCATION - PRIMARY LEARNER  GRADUATED HIGH SCHOOL OR GED  
BARRIERS PRIMARY LEARNER NONE PRIMARY LANGUAGE ENGLISH  
LEARNER PREFERENCE PRIMARY DEMONSTRATION  
ANSWERED BY PATIENT  
RELATIONSHIP OTHER Depression Screening: PHQ over the last two weeks 2018 Little interest or pleasure in doing things Not at all Feeling down, depressed, irritable, or hopeless Not at all Total Score PHQ 2 0 Fall Risk Assessment:  
 
Fall Risk Assessment, last 12 mths 2018 Able to walk? No  
Fall in past 12 months? -  
 
Abuse Screening:  
 
Abuse Screening Questionnaire 10/16/2017 Do you ever feel afraid of your partner? Radha Qurashad Are you in a relationship with someone who physically or mentally threatens you? Radha Quiver Is it safe for you to go home? Milagros Lam Coordination of Care Questionaire: 1. Have you been to the ER, urgent care clinic since your last visit? Hospitalized since your last visit? NO 
 
2. Have you seen or consulted any other health care providers outside of the [facility] since your last visit? Include any pap smears or colon screening. NO Advanced Directive: 1. Do you have an Advanced Directive? NO 
 
2. Would you like information on Advanced Directives?  YES

## 2018-11-02 NOTE — PROGRESS NOTES
Assessment/Plan: 1. Essential hypertension w/ L adrenal adenoma 
-hasn't had meds in 2 days. Resume dilt. F/u in 1/2019 (per family request to be seen AFTER holidays) 
- CBC W/O DIFF; Future - METABOLIC PANEL, COMPREHENSIVE; Future - LIPID PANEL; Future 
- dilTIAZem ER (CARDIZEM LA) 240 mg Tb24 tablet; Take 1 Tab by mouth daily. Dispense: 90 Tab; Refill: 3 
 
2. Acquired hypothyroidism 
-ck labs. - TSH 3RD GENERATION; Future 3. Anemia, unspecified type 
-ck iron, b12 
- CBC W/O DIFF; Future 
- IRON; Future - FERRITIN; Future - VITAMIN B12; Future 4. Type 2 diabetes mellitus without complication, without long-term current use of insulin (HCC) 
-ck labs - MICROALBUMIN, UR, RAND W/ MICROALB/CREAT RATIO; Future 
- HEMOGLOBIN A1C W/O EAG; Future 5. Other general symptoms and signs - VITAMIN B12; Future 6. Nonrheumatic aortic valve stenosis The plan was discussed with the patient. The patient verbalized understanding and is in agreement with the plan. All medication potential side effects were discussed with the patient. Health Maintenance:  
Health Maintenance Topic Date Due  Shingrix Vaccine Age 50> (1 of 2) 04/23/1973  Influenza Age 5 to Adult  08/01/2018  MEDICARE YEARLY EXAM  10/17/2018  GLAUCOMA SCREENING Q2Y  10/16/2019  
 DTaP/Tdap/Td series (2 - Td) 10/16/2027  Bone Densitometry (Dexa) Screening  Addressed  Pneumococcal 65+ High/Highest Risk  Addressed Damaris Keating is a 80 y.o. female and presents with Cholesterol Problem (Establish care) Subjective: 
Pt of Dr. Edmund White present to establish care. DM2 - no A1c since 2016. The family at first stated the \"patient didn't have diabetes\". Then they stated that at age 80, they aren't interested in aggressive treatment. HTN- has L adrenal adenoma. On dilt. Hasn't had meds in 2 days Hypothyroid- due for labs. HLD- on crestor. No h/o elevated LDL. On crestor. Has h/o anemia and ckd - no recent eval that I can appreciate. Low iron 2016. Is taking iron, tolerating it well qoday. They report a good appetite. Had low albumin last labs. ROS: 
Constitutional: No recent weight change. No weakness/fatigue. No f/c. Cardiovascular: No CP/palpitations. No VIEYRA/orthopnea/PND. Respiratory: No cough/sputum, dyspnea, wheezing. Gastointestinal: No dysphagia, reflux. No n/v. No constipation/diarrhea. No melena/rectal bleeding. Heme: + h/o anemia. No easy bleeding/bruising. Allergy/Immunology: No seasonal rhinitis. Denies frequent colds, sinus/ear infections. Neurological: No seizures/numbness/weakness. No paresthesias. Psychiatric:  No depression, anxiety. The problem list was updated as a part of today's visit. Patient Active Problem List  
Diagnosis Code  Cholelithiasis K80.20  Hepatomegaly R16.0  
 Essential hypertension I10  
 Hypothyroidism E03.9  CKD (chronic kidney disease) stage 3, GFR 30-59 ml/min (HCC) N18.3  Elevated blood sugar R73.9  Iron deficiency anemia D50.9  Adrenal adenoma D35.00  Hypoalbuminemia E88.09  
 First degree AV block I44.0 The PSH, FH were reviewed. SH: Social History Tobacco Use  Smoking status: Never Smoker  Smokeless tobacco: Never Used Substance Use Topics  Alcohol use: No  
 Drug use: No  
 
 
Medications/Allergies: 
Current Outpatient Medications on File Prior to Visit Medication Sig Dispense Refill  levothyroxine (SYNTHROID) 100 mcg tablet Take 1 Tab by mouth Daily (before breakfast). Brand only 90 Tab 3  
 rosuvastatin (CRESTOR) 5 mg tablet Take 1 Tab by mouth nightly. 90 Tab 3  
 ferrous sulfate, dried (SLOW RELEASE IRON) 159 mg (45 mg iron) TbER tablet Take 1 Tab by mouth daily. 100 Tab 0  
 OTHER Gel mattress overlay 1 Each 0  
 cholecalciferol (VITAMIN D3) 1,000 unit tablet Take  by mouth daily.  aspirin 81 mg chewable tablet Take 81 mg by mouth daily. No current facility-administered medications on file prior to visit. No Known Allergies Objective: 
Visit Vitals /72 (BP 1 Location: Left arm, BP Patient Position: Sitting) Pulse 98 Temp 98.4 °F (36.9 °C) (Oral) Resp 20 Ht 5' 5\" (1.651 m) SpO2 96% BMI 28.29 kg/m² Constitutional: Well developed, nourished, no distress, alert Eyes: Conjunctiva normal. PERRL. Eyelids normal.  
CV: S1, S2.  RRR.  3/6 RICO holosystolic murmur. No thrills palpated. No carotid bruits. Intact distal pulses. No edema. No aortic bruits. Pulm: No abnormalities on inspection. Clear to auscultation bilaterally. No wheezing/rhonchi. Normal effort. GI: Soft, nontender, nondistended. Normal active bowel sounds. Neuro: A/O x 3. No focal motor or sensory deficits. Speech normal.  
Psych: Appropriate affect, judgement and insight. Short-term memory intact.

## 2018-11-05 DIAGNOSIS — D50.9 IRON DEFICIENCY ANEMIA, UNSPECIFIED IRON DEFICIENCY ANEMIA TYPE: Primary | ICD-10-CM

## 2018-11-05 LAB
IRON SATN MFR SERPL: 21 %
IRON SERPL-MCNC: 48 UG/DL (ref 50–175)
TIBC SERPL-MCNC: 231 UG/DL (ref 250–450)

## 2018-12-31 RX ORDER — LEVOTHYROXINE SODIUM 100 UG/1
100 TABLET ORAL
Qty: 90 TAB | Refills: 3 | Status: SHIPPED | OUTPATIENT
Start: 2018-12-31 | End: 2019-01-01 | Stop reason: SDUPTHER

## 2019-01-01 ENCOUNTER — HOSPITAL ENCOUNTER (OUTPATIENT)
Dept: LAB | Age: 84
Discharge: HOME | End: 2019-12-16
Payer: MEDICARE

## 2019-01-01 ENCOUNTER — OFFICE VISIT (OUTPATIENT)
Dept: FAMILY MEDICINE CLINIC | Age: 84
End: 2019-01-01

## 2019-01-01 ENCOUNTER — PATIENT OUTREACH (OUTPATIENT)
Dept: FAMILY MEDICINE CLINIC | Age: 84
End: 2019-01-01

## 2019-01-01 ENCOUNTER — TELEPHONE (OUTPATIENT)
Dept: FAMILY MEDICINE CLINIC | Age: 84
End: 2019-01-01

## 2019-01-01 ENCOUNTER — HOSPITAL ENCOUNTER (OUTPATIENT)
Dept: LAB | Age: 84
Discharge: HOME | End: 2019-11-04
Payer: MEDICARE

## 2019-01-01 VITALS
BODY MASS INDEX: 28.29 KG/M2 | RESPIRATION RATE: 18 BRPM | SYSTOLIC BLOOD PRESSURE: 154 MMHG | TEMPERATURE: 96.7 F | HEART RATE: 74 BPM | HEIGHT: 65 IN | DIASTOLIC BLOOD PRESSURE: 69 MMHG | OXYGEN SATURATION: 97 %

## 2019-01-01 VITALS
HEIGHT: 65 IN | HEART RATE: 67 BPM | TEMPERATURE: 97.1 F | BODY MASS INDEX: 28.29 KG/M2 | DIASTOLIC BLOOD PRESSURE: 61 MMHG | RESPIRATION RATE: 16 BRPM | OXYGEN SATURATION: 98 % | SYSTOLIC BLOOD PRESSURE: 125 MMHG

## 2019-01-01 VITALS
HEART RATE: 80 BPM | RESPIRATION RATE: 20 BRPM | SYSTOLIC BLOOD PRESSURE: 130 MMHG | OXYGEN SATURATION: 98 % | DIASTOLIC BLOOD PRESSURE: 78 MMHG | TEMPERATURE: 98 F

## 2019-01-01 DIAGNOSIS — R73.9 ELEVATED BLOOD SUGAR: ICD-10-CM

## 2019-01-01 DIAGNOSIS — I10 ESSENTIAL HYPERTENSION: Primary | ICD-10-CM

## 2019-01-01 DIAGNOSIS — D62 ACUTE BLOOD LOSS ANEMIA: ICD-10-CM

## 2019-01-01 DIAGNOSIS — E03.9 ACQUIRED HYPOTHYROIDISM: ICD-10-CM

## 2019-01-01 DIAGNOSIS — N18.30 CKD (CHRONIC KIDNEY DISEASE) STAGE 3, GFR 30-59 ML/MIN (HCC): ICD-10-CM

## 2019-01-01 DIAGNOSIS — D35.02 ADENOMA OF LEFT ADRENAL GLAND: ICD-10-CM

## 2019-01-01 DIAGNOSIS — D50.9 IRON DEFICIENCY ANEMIA, UNSPECIFIED IRON DEFICIENCY ANEMIA TYPE: ICD-10-CM

## 2019-01-01 DIAGNOSIS — I10 ESSENTIAL HYPERTENSION: ICD-10-CM

## 2019-01-01 DIAGNOSIS — E88.09 HYPOALBUMINEMIA: ICD-10-CM

## 2019-01-01 DIAGNOSIS — Z00.00 MEDICARE ANNUAL WELLNESS VISIT, SUBSEQUENT: ICD-10-CM

## 2019-01-01 DIAGNOSIS — Z09 HOSPITAL DISCHARGE FOLLOW-UP: ICD-10-CM

## 2019-01-01 DIAGNOSIS — R73.9 ELEVATED BLOOD SUGAR: Primary | ICD-10-CM

## 2019-01-01 DIAGNOSIS — K92.2 LOWER GI BLEED: Primary | ICD-10-CM

## 2019-01-01 LAB
ALBUMIN SERPL-MCNC: 3.4 G/DL (ref 3.4–5)
ALBUMIN/GLOB SERPL: 0.9 {RATIO} (ref 0.8–1.7)
ALP SERPL-CCNC: 85 U/L (ref 45–117)
ALT SERPL-CCNC: 15 U/L (ref 13–56)
ANION GAP SERPL CALC-SCNC: 6 MMOL/L (ref 3–18)
AST SERPL-CCNC: 11 U/L (ref 10–38)
BILIRUB SERPL-MCNC: 0.9 MG/DL (ref 0.2–1)
BUN SERPL-MCNC: 34 MG/DL (ref 7–18)
BUN/CREAT SERPL: 22 (ref 12–20)
CALCIUM SERPL-MCNC: 8.8 MG/DL (ref 8.5–10.1)
CHLORIDE SERPL-SCNC: 115 MMOL/L (ref 100–111)
CHOLEST SERPL-MCNC: 135 MG/DL
CO2 SERPL-SCNC: 23 MMOL/L (ref 21–32)
CREAT SERPL-MCNC: 1.52 MG/DL (ref 0.6–1.3)
ERYTHROCYTE [DISTWIDTH] IN BLOOD BY AUTOMATED COUNT: 15.6 % (ref 11.6–14.5)
ERYTHROCYTE [DISTWIDTH] IN BLOOD BY AUTOMATED COUNT: 16.9 % (ref 11.6–14.5)
GLOBULIN SER CALC-MCNC: 3.9 G/DL (ref 2–4)
GLUCOSE SERPL-MCNC: 131 MG/DL (ref 74–99)
HBA1C MFR BLD HPLC: 6.2 %
HBA1C MFR BLD HPLC: 6.3 %
HCT VFR BLD AUTO: 31.4 % (ref 35–45)
HCT VFR BLD AUTO: 36.6 % (ref 35–45)
HDLC SERPL-MCNC: 76 MG/DL (ref 40–60)
HDLC SERPL: 1.8 {RATIO} (ref 0–5)
HGB BLD-MCNC: 11.4 G/DL (ref 12–16)
HGB BLD-MCNC: 9.8 G/DL (ref 12–16)
IRON SATN MFR SERPL: 10 %
IRON SATN MFR SERPL: 14 %
IRON SERPL-MCNC: 20 UG/DL (ref 50–175)
IRON SERPL-MCNC: 33 UG/DL (ref 50–175)
LDLC SERPL CALC-MCNC: 44.2 MG/DL (ref 0–100)
LIPID PROFILE,FLP: ABNORMAL
MCH RBC QN AUTO: 29.1 PG (ref 24–34)
MCH RBC QN AUTO: 29.5 PG (ref 24–34)
MCHC RBC AUTO-ENTMCNC: 31.1 G/DL (ref 31–37)
MCHC RBC AUTO-ENTMCNC: 31.2 G/DL (ref 31–37)
MCV RBC AUTO: 93.4 FL (ref 74–97)
MCV RBC AUTO: 94.6 FL (ref 74–97)
PLATELET # BLD AUTO: 214 K/UL (ref 135–420)
PLATELET # BLD AUTO: 295 K/UL (ref 135–420)
PMV BLD AUTO: 10.6 FL (ref 9.2–11.8)
PMV BLD AUTO: 12.1 FL (ref 9.2–11.8)
POTASSIUM SERPL-SCNC: 4.4 MMOL/L (ref 3.5–5.5)
PROT SERPL-MCNC: 7.3 G/DL (ref 6.4–8.2)
RBC # BLD AUTO: 3.32 M/UL (ref 4.2–5.3)
RBC # BLD AUTO: 3.92 M/UL (ref 4.2–5.3)
SODIUM SERPL-SCNC: 144 MMOL/L (ref 136–145)
TIBC SERPL-MCNC: 204 UG/DL (ref 250–450)
TIBC SERPL-MCNC: 235 UG/DL (ref 250–450)
TRIGL SERPL-MCNC: 74 MG/DL (ref ?–150)
TSH SERPL DL<=0.05 MIU/L-ACNC: 0.82 UIU/ML (ref 0.36–3.74)
VLDLC SERPL CALC-MCNC: 14.8 MG/DL
WBC # BLD AUTO: 7 K/UL (ref 4.6–13.2)
WBC # BLD AUTO: 7.3 K/UL (ref 4.6–13.2)

## 2019-01-01 PROCEDURE — 36415 COLL VENOUS BLD VENIPUNCTURE: CPT

## 2019-01-01 PROCEDURE — 83540 ASSAY OF IRON: CPT

## 2019-01-01 PROCEDURE — 85027 COMPLETE CBC AUTOMATED: CPT

## 2019-01-01 PROCEDURE — 84443 ASSAY THYROID STIM HORMONE: CPT

## 2019-01-01 PROCEDURE — 80061 LIPID PANEL: CPT

## 2019-01-01 PROCEDURE — 80053 COMPREHEN METABOLIC PANEL: CPT

## 2019-01-01 RX ORDER — ROSUVASTATIN CALCIUM 5 MG/1
5 TABLET, COATED ORAL
Qty: 90 TAB | Refills: 3 | Status: SHIPPED | OUTPATIENT
Start: 2019-01-01 | End: 2020-01-01

## 2019-01-01 RX ORDER — DILTIAZEM HYDROCHLORIDE 240 MG/1
CAPSULE, COATED, EXTENDED RELEASE ORAL
Qty: 90 CAP | Refills: 3 | Status: SHIPPED | OUTPATIENT
Start: 2019-01-01 | End: 2020-01-01

## 2019-01-01 RX ORDER — LANOLIN ALCOHOL/MO/W.PET/CERES
CREAM (GRAM) TOPICAL 2 TIMES DAILY
COMMUNITY
End: 2020-01-01 | Stop reason: SDUPTHER

## 2019-01-01 RX ORDER — LEVOTHYROXINE SODIUM 100 UG/1
100 TABLET ORAL
Qty: 90 TAB | Refills: 3 | Status: SHIPPED | OUTPATIENT
Start: 2019-01-01 | End: 2020-01-01

## 2019-01-28 ENCOUNTER — OFFICE VISIT (OUTPATIENT)
Dept: FAMILY MEDICINE CLINIC | Age: 84
End: 2019-01-28

## 2019-01-28 VITALS
SYSTOLIC BLOOD PRESSURE: 140 MMHG | HEART RATE: 76 BPM | RESPIRATION RATE: 20 BRPM | DIASTOLIC BLOOD PRESSURE: 70 MMHG | TEMPERATURE: 98.2 F | OXYGEN SATURATION: 95 %

## 2019-01-28 DIAGNOSIS — N18.30 TYPE 2 DIABETES MELLITUS WITH STAGE 3 CHRONIC KIDNEY DISEASE, WITHOUT LONG-TERM CURRENT USE OF INSULIN (HCC): Primary | ICD-10-CM

## 2019-01-28 DIAGNOSIS — I10 ESSENTIAL HYPERTENSION: ICD-10-CM

## 2019-01-28 DIAGNOSIS — E11.22 TYPE 2 DIABETES MELLITUS WITH STAGE 3 CHRONIC KIDNEY DISEASE, WITHOUT LONG-TERM CURRENT USE OF INSULIN (HCC): Primary | ICD-10-CM

## 2019-01-28 LAB
ALBUMIN UR QL STRIP: 150 MG/L
CREATININE, URINE POC: 100 MG/DL
MICROALBUMIN/CREAT RATIO POC: >300 MG/G

## 2019-01-28 NOTE — PROGRESS NOTES
Padmaja Hoff is a 80 y.o. female (: 1923) presenting to address: Chief Complaint Patient presents with  Hypertension  Hypothyroidism Vitals:  
 19 8357 BP: 140/70 Pulse: 76 Resp: 20 Temp: 98.2 °F (36.8 °C) TempSrc: Oral  
SpO2: 95% PainSc:   0 - No pain Learning Assessment:  
 
Learning Assessment 10/3/2014 PRIMARY LEARNER Patient HIGHEST LEVEL OF EDUCATION - PRIMARY LEARNER  GRADUATED HIGH SCHOOL OR GED  
BARRIERS PRIMARY LEARNER NONE PRIMARY LANGUAGE ENGLISH  
LEARNER PREFERENCE PRIMARY DEMONSTRATION  
ANSWERED BY PATIENT  
RELATIONSHIP OTHER Depression Screening: PHQ over the last two weeks 2019 Little interest or pleasure in doing things Not at all Feeling down, depressed, irritable, or hopeless Not at all Total Score PHQ 2 0 Fall Risk Assessment:  
 
Fall Risk Assessment, last 12 mths 2019 Able to walk? No  
Fall in past 12 months? -  
 
Abuse Screening:  
 
Abuse Screening Questionnaire 10/16/2017 Do you ever feel afraid of your partner? Liz Weiss Are you in a relationship with someone who physically or mentally threatens you? Liz Weiss Is it safe for you to go home? Murleen Nissen Coordination of Care Questionaire: 1. Have you been to the ER, urgent care clinic since your last visit? Hospitalized since your last visit? NO 
 
2. Have you seen or consulted any other health care providers outside of the [address] [place] [address] since your last visit? Include any pap smears or colon screening. NO Advanced Directive: 1. Do you have an Advanced Directive? YES 
 
2. Would you like information on Advanced Directives?  NO

## 2019-01-28 NOTE — PROGRESS NOTES
Assessment/Plan: 1. Type 2 diabetes mellitus with stage 3 chronic kidney disease, without long-term current use of insulin ([facility] [address] -f/u in 4mos - AMB POC URINE, MICROALBUMIN, SEMIQUANT (3 RESULTS) 
-  DIABETES FOOT EXAM 
 
2. Essential hypertension 
-no change in meds. F/u in 4mos The plan was discussed with the patient. The patient verbalized understanding and is in agreement with the plan. All medication potential side effects were discussed with the patient. Health Maintenance:  
Health Maintenance Topic Date Due  
 MICROALBUMIN Q1  04/23/1933  
 EYE EXAM RETINAL OR DILATED  04/23/1933  Shingrix Vaccine Age 50> (1 of 2) 04/23/1973  Pneumococcal 65+ Low/Medium Risk (1 of 2 - PCV13) 04/23/1988  Influenza Age 5 to Adult  08/01/2018  MEDICARE YEARLY EXAM  10/17/2018  HEMOGLOBIN A1C Q6M  05/02/2019  GLAUCOMA SCREENING Q2Y  10/16/2019  LIPID PANEL Q1  11/02/2019  
 FOOT EXAM Q1  01/28/2020  
 DTaP/Tdap/Td series (2 - Td) 10/16/2027  Bone Densitometry (Dexa) Screening  Addressed Marleny Dumont is a 80 y.o. female and presents with Hypertension and Hypothyroidism Subjective: HTN - bp remains slightly high. Took meds today. DM2 -  Too early to check A1c. Urine shows nephropathy. ROS: 
Constitutional: No recent weight change. No weakness/fatigue. No f/c. Cardiovascular: No CP/palpitations. No VIEYRA/orthopnea/PND. Respiratory: No cough/sputum, dyspnea, wheezing. Gastointestinal: No dysphagia, reflux. No n/v. No constipation/diarrhea. No melena/rectal bleeding. The problem list was updated as a part of today's visit. Patient Active Problem List  
Diagnosis Code  Cholelithiasis K80.20  Hepatomegaly R16.0  
 Essential hypertension I10  
 Hypothyroidism E03.9  CKD (chronic kidney disease) stage 3, GFR 30-59 ml/min ([facility]) N18.3  Elevated blood sugar R73.9  Iron deficiency anemia D50.9  Adrenal adenoma D35.00  
  Hypoalbuminemia E88.09  
 First degree AV block I44.0  Nonrheumatic aortic valve stenosis I35.0 The PSH, FH were reviewed. SH: Social History Tobacco Use  Smoking status: Never Smoker  Smokeless tobacco: Never Used Substance Use Topics  Alcohol use: No  
 Drug use: No  
 
 
Medications/Allergies: 
Current Outpatient Medications on File Prior to Visit Medication Sig Dispense Refill  levothyroxine (SYNTHROID) 100 mcg tablet Take 1 Tab by mouth Daily (before breakfast). Brand only 90 Tab 3  
 dilTIAZem ER (CARDIZEM LA) 240 mg Tb24 tablet Take 1 Tab by mouth daily. 90 Tab 3  
 rosuvastatin (CRESTOR) 5 mg tablet Take 1 Tab by mouth nightly. 90 Tab 3  
 ferrous sulfate, dried (SLOW RELEASE IRON) 159 mg (45 mg iron) TbER tablet Take 1 Tab by mouth daily. 100 Tab 0  
 OTHER Gel mattress overlay 1 Each 0  
 cholecalciferol (VITAMIN D3) 1,000 unit tablet Take  by mouth daily.  aspirin 81 mg chewable tablet Take 81 mg by mouth daily. No current facility-administered medications on file prior to visit. No Known Allergies Objective: 
Visit Vitals /70 (BP 1 Location: Left arm, BP Patient Position: Sitting) Pulse 76 Temp 98.2 °F (36.8 °C) (Oral) Resp 20 SpO2 95% Constitutional: Well developed, nourished, no distress, alert CV: S1, S2.  RRR. Holosystolic murmur all point with radiation to axilla and carotids. Intact distal pulses. No edema. No aortic bruits. Pulm: No abnormalities on inspection. Clear to auscultation bilaterally. No wheezing/rhonchi. Normal effort. GI: Soft, nontender, nondistended. Normal active bowel sounds. Monofilament nml bilat Labwork and Ancillary Studies: CBC w/Diff Lab Results Component Value Date/Time WBC 9.3 11/02/2018 09:16 AM  
 HGB 12.5 11/02/2018 09:16 AM  
 PLATELET 141 76/80/6546 09:16 AM  
  
 
 Basic Metabolic Profile/LFTs Lab Results Component Value Date/Time Sodium 143 11/02/2018 09:16 AM  
 Potassium 4.0 11/02/2018 09:16 AM  
 Chloride 113 (H) 11/02/2018 09:16 AM  
 CO2 22 11/02/2018 09:16 AM  
 Anion gap 8 11/02/2018 09:16 AM  
 Glucose 170 (H) 11/02/2018 09:16 AM  
 BUN 22 (H) 11/02/2018 09:16 AM  
 Creatinine 1.34 (H) 11/02/2018 09:16 AM  
 BUN/Creatinine ratio 16 11/02/2018 09:16 AM  
 GFR est AA 45 (L) 11/02/2018 09:16 AM  
 GFR est non-AA 37 (L) 11/02/2018 09:16 AM  
 Calcium 8.3 (L) 11/02/2018 09:16 AM  
  
Lab Results Component Value Date/Time ALT (SGPT) 21 11/02/2018 09:16 AM  
 AST (SGOT) 16 11/02/2018 09:16 AM  
 Alk. phosphatase 86 11/02/2018 09:16 AM  
 Bilirubin, total 0.8 11/02/2018 09:16 AM  
 
 
Cholesterol Lab Results Component Value Date/Time  Cholesterol, total 120 11/02/2018 09:16 AM  
 HDL Cholesterol 62 (H) 11/02/2018 09:16 AM  
 LDL, calculated 35.2 11/02/2018 09:16 AM  
 Triglyceride 114 11/02/2018 09:16 AM  
 CHOL/HDL Ratio 1.9 11/02/2018 09:16 AM

## 2019-05-20 NOTE — PROGRESS NOTES
Assessment/Plan: 1. Elevated blood sugar 
-A1c improved. - AMB POC HEMOGLOBIN A1C 
 
2. Essential hypertension 
-bp good 3. Adenoma of left adrenal gland 
-stable 4. Medicare annual wellness visit, subsequent 5. Iron deficiency anemia, unspecified iron deficiency anemia type 
-ck labs The plan was discussed with the patient. The patient verbalized understanding and is in agreement with the plan. All medication potential side effects were discussed with the patient. Health Maintenance:  
Health Maintenance Topic Date Due  
 EYE EXAM RETINAL OR DILATED  04/23/1933  Shingrix Vaccine Age 50> (1 of 2) 04/23/1973  Pneumococcal 65+ years (1 of 2 - PCV13) 04/23/1988  MEDICARE YEARLY EXAM  10/17/2018  HEMOGLOBIN A1C Q6M  05/02/2019  Influenza Age 5 to Adult  08/01/2019  GLAUCOMA SCREENING Q2Y  10/16/2019  LIPID PANEL Q1  11/02/2019  
 FOOT EXAM Q1  01/28/2020  MICROALBUMIN Q1  01/28/2020  
 DTaP/Tdap/Td series (2 - Td) 10/16/2027  Bone Densitometry (Dexa) Screening  Addressed Brian Mccormack is a 80 y.o. female and presents with Hypertension and Annual Wellness Visit Subjective: 
Elevated bs- A1c is 6.3. HTN - bp is good. No cp or dyspnea. ROS: 
Constitutional: No recent weight change. No weakness/fatigue. No f/c. Cardiovascular: No CP/palpitations. No VIEYRA/orthopnea/PND. Respiratory: No cough/sputum, dyspnea, wheezing. Gastointestinal: No dysphagia, reflux. No n/v. No constipation/diarrhea. No melena/rectal bleeding. The problem list was updated as a part of today's visit. Patient Active Problem List  
Diagnosis Code  Cholelithiasis K80.20  Hepatomegaly R16.0  
 Essential hypertension I10  
 Hypothyroidism E03.9  CKD (chronic kidney disease) stage 3, GFR 30-59 ml/min (HCC) N18.3  Elevated blood sugar R73.9  Iron deficiency anemia D50.9  Adrenal adenoma D35.00  Hypoalbuminemia E88.09  
  First degree AV block I44.0  Nonrheumatic aortic valve stenosis I35.0 The PSH, FH were reviewed. SH: Social History Tobacco Use  Smoking status: Never Smoker  Smokeless tobacco: Never Used Substance Use Topics  Alcohol use: No  
 Drug use: No  
 
 
Medications/Allergies: 
Current Outpatient Medications on File Prior to Visit Medication Sig Dispense Refill  levothyroxine (SYNTHROID) 100 mcg tablet Take 1 Tab by mouth Daily (before breakfast). Brand only 90 Tab 3  
 dilTIAZem ER (CARDIZEM LA) 240 mg Tb24 tablet Take 1 Tab by mouth daily. 90 Tab 3  
 rosuvastatin (CRESTOR) 5 mg tablet Take 1 Tab by mouth nightly. 90 Tab 3  
 ferrous sulfate, dried (SLOW RELEASE IRON) 159 mg (45 mg iron) TbER tablet Take 1 Tab by mouth daily. 100 Tab 0  cholecalciferol (VITAMIN D3) 1,000 unit tablet Take  by mouth daily.  aspirin 81 mg chewable tablet Take 81 mg by mouth daily.  OTHER Gel mattress overlay 1 Each 0 No current facility-administered medications on file prior to visit. No Known Allergies Objective: 
Visit Vitals /78 (BP 1 Location: Right arm, BP Patient Position: Sitting) Pulse 80 Temp 98 °F (36.7 °C) (Oral) Resp 20 SpO2 98% Constitutional: Well developed, nourished, no distress, alert CV: S1, S2.  RRR.  3/6 RICO with radiation to carotids, no edema Pulm: No abnormalities on inspection. Clear to auscultation bilaterally. No wheezing/rhonchi. Normal effort. GI: S/NT/ND Labwork and Ancillary Studies: CBC w/Diff Lab Results Component Value Date/Time WBC 9.3 11/02/2018 09:16 AM  
 HGB 12.5 11/02/2018 09:16 AM  
 PLATELET 531 96/09/8240 09:16 AM  
  
 
 Basic Metabolic Profile/LFTs Lab Results Component Value Date/Time  Sodium 143 11/02/2018 09:16 AM  
 Potassium 4.0 11/02/2018 09:16 AM  
 Chloride 113 (H) 11/02/2018 09:16 AM  
 CO2 22 11/02/2018 09:16 AM  
 Anion gap 8 11/02/2018 09:16 AM  
 Glucose 170 (H) 11/02/2018 09:16 AM  
 BUN 22 (H) 11/02/2018 09:16 AM  
 Creatinine 1.34 (H) 11/02/2018 09:16 AM  
 BUN/Creatinine ratio 16 11/02/2018 09:16 AM  
 GFR est AA 45 (L) 11/02/2018 09:16 AM  
 GFR est non-AA 37 (L) 11/02/2018 09:16 AM  
 Calcium 8.3 (L) 11/02/2018 09:16 AM  
  
Lab Results Component Value Date/Time ALT (SGPT) 21 11/02/2018 09:16 AM  
 AST (SGOT) 16 11/02/2018 09:16 AM  
 Alk. phosphatase 86 11/02/2018 09:16 AM  
 Bilirubin, total 0.8 11/02/2018 09:16 AM  
 
 
Cholesterol Lab Results Component Value Date/Time Cholesterol, total 120 11/02/2018 09:16 AM  
 HDL Cholesterol 62 (H) 11/02/2018 09:16 AM  
 LDL, calculated 35.2 11/02/2018 09:16 AM  
 Triglyceride 114 11/02/2018 09:16 AM  
 CHOL/HDL Ratio 1.9 11/02/2018 09:16 AM  
 
 
 
 
This is the Subsequent Medicare Annual Wellness Exam, performed 12 months or more after the Initial AWV or the last Subsequent AWV I have reviewed the patient's medical history in detail and updated the computerized patient record. History Past Medical History:  
Diagnosis Date  Hypercholesterolemia  Hypertension  Thyroid disease No past surgical history on file. Current Outpatient Medications Medication Sig Dispense Refill  levothyroxine (SYNTHROID) 100 mcg tablet Take 1 Tab by mouth Daily (before breakfast). Brand only 90 Tab 3  
 dilTIAZem ER (CARDIZEM LA) 240 mg Tb24 tablet Take 1 Tab by mouth daily. 90 Tab 3  
 rosuvastatin (CRESTOR) 5 mg tablet Take 1 Tab by mouth nightly. 90 Tab 3  
 ferrous sulfate, dried (SLOW RELEASE IRON) 159 mg (45 mg iron) TbER tablet Take 1 Tab by mouth daily. 100 Tab 0  cholecalciferol (VITAMIN D3) 1,000 unit tablet Take  by mouth daily.  aspirin 81 mg chewable tablet Take 81 mg by mouth daily.  OTHER Gel mattress overlay 1 Each 0 No Known Allergies No family history on file. Social History Tobacco Use  Smoking status: Never Smoker  Smokeless tobacco: Never Used Substance Use Topics  Alcohol use: No  
 
Patient Active Problem List  
Diagnosis Code  Cholelithiasis K80.20  Hepatomegaly R16.0  
 Essential hypertension I10  
 Hypothyroidism E03.9  CKD (chronic kidney disease) stage 3, GFR 30-59 ml/min ([facility]) N18.3  Elevated blood sugar R73.9  Iron deficiency anemia D50.9  Adrenal adenoma D35.00  Hypoalbuminemia E88.09  
 First degree AV block I44.0  Nonrheumatic aortic valve stenosis I35.0 Depression Risk Factor Screening:  
 
3 most recent PHQ Screens 5/20/2019 Little interest or pleasure in doing things Not at all Feeling down, depressed, irritable, or hopeless Not at all Total Score PHQ 2 0 Alcohol Risk Factor Screening: You do not drink alcohol or very rarely. Functional Ability and Level of Safety:  
Hearing Loss Hearing is good. Activities of Daily Living The home contains: shower chair Patient needs help with:  transportation, shopping, preparing meals, laundry, housework and walking Fall Risk Fall Risk Assessment, last 12 mths 5/20/2019 Able to walk? No  
Fall in past 12 months? -  
 
 
Abuse Screen Patient is not abused Cognitive Screening Evaluation of Cognitive Function: 
Has your family/caregiver stated any concerns about your memory: no 
Normal 
 
Patient Care Team  
Patient Care Team: 
Leena Dickey MD as PCP - General (Internal Medicine) Assessment/Plan Education and counseling provided: 
Are appropriate based on today's review and evaluation End-of-Life planning (with patient's consent) Diagnoses and all orders for this visit: 1. Elevated blood sugar -     AMB POC HEMOGLOBIN A1C 
-     HEMOGLOBIN A1C W/O EAG; Future -     MICROALBUMIN, UR, RAND W/ MICROALB/CREAT RATIO; Future 2. Essential hypertension 
-     CBC W/O DIFF; Future -     METABOLIC PANEL, COMPREHENSIVE; Future -     LIPID PANEL; Future 3. Adenoma of left adrenal gland 4. Medicare annual wellness visit, subsequent 5. Iron deficiency anemia, unspecified iron deficiency anemia type 
-     CBC W/O DIFF; Future Health Maintenance Due Topic Date Due  
 EYE EXAM RETINAL OR DILATED  04/23/1933  Pneumococcal 65+ years (1 of 2 - PCV13) 04/23/1988

## 2019-05-20 NOTE — PROGRESS NOTES
David Ramirez is a 80 y.o. female (: 1923) presenting to address: Chief Complaint Patient presents with  Hypertension Christopher Meza Annual Wellness Visit Vitals:  
 19 8575 BP: 130/78 Pulse: 80 Resp: 20 Temp: 98 °F (36.7 °C) TempSrc: Oral  
SpO2: 98% PainSc:   0 - No pain Hearing/Vision:  
Vision Screening Comments: Patient decline, In care of Ophthalmology due to macular degenation. Learning Assessment:  
 
Learning Assessment 10/3/2014 PRIMARY LEARNER Patient HIGHEST LEVEL OF EDUCATION - PRIMARY LEARNER  GRADUATED HIGH SCHOOL OR GED  
BARRIERS PRIMARY LEARNER NONE PRIMARY LANGUAGE ENGLISH  
LEARNER PREFERENCE PRIMARY DEMONSTRATION  
ANSWERED BY PATIENT  
RELATIONSHIP OTHER Depression Screening:  
 
3 most recent PHQ Screens 2019 Little interest or pleasure in doing things Not at all Feeling down, depressed, irritable, or hopeless Not at all Total Score PHQ 2 0 Fall Risk Assessment:  
 
Fall Risk Assessment, last 12 mths 2019 Able to walk? No  
Fall in past 12 months? -  
 
Abuse Screening:  
 
Abuse Screening Questionnaire 2019 Do you ever feel afraid of your partner? Abelardo Streeter Are you in a relationship with someone who physically or mentally threatens you? Abelardo Streeter Is it safe for you to go home? Rain Lopez Coordination of Care Questionaire: 1. Have you been to the ER, urgent care clinic since your last visit? Hospitalized since your last visit? NO 
 
2. Have you seen or consulted any other health care providers outside of the [address] [place] [address] since your last visit? Include any pap smears or colon screening. NO Advanced Directive: 1. Do you have an Advanced Directive? YES 
 
2. Would you like information on Advanced Directives?  NO

## 2019-05-20 NOTE — PATIENT INSTRUCTIONS
Medicare Wellness Visit, Female The best way to live healthy is to have a lifestyle where you eat a well-balanced diet, exercise regularly, limit alcohol use, and quit all forms of tobacco/nicotine, if applicable. Regular preventive services are another way to keep healthy. Preventive services (vaccines, screening tests, monitoring & exams) can help personalize your care plan, which helps you manage your own care. Screening tests can find health problems at the earliest stages, when they are easiest to treat. [facility] follows the current, evidence-based guidelines published by the [facility] Herb Melody ([facility]) when recommending preventive services for our patients. Because we follow these guidelines, sometimes recommendations change over time as research supports it. (For example, mammograms used to be recommended annually. Even though Medicare will still pay for an annual mammogram, the newer guidelines recommend a mammogram every two years for women of average risk.) Of course, you and your doctor may decide to screen more often for some diseases, based on your risk and your health status. Preventive services for you include: - Medicare offers their members a free annual wellness visit, which is time for you and your primary care provider to discuss and plan for your preventive service needs. Take advantage of this benefit every year! 
-All adults over the age of 72 should receive the recommended pneumonia vaccines. Current USPSTF guidelines recommend a series of two vaccines for the best pneumonia protection.  
-All adults should have a flu vaccine yearly and a tetanus vaccine every 10 years. All adults age 61 and older should receive a shingles vaccine once in their lifetime.   
-A bone mass density test is recommended when a woman turns 65 to screen for osteoporosis. This test is only recommended one time, as a screening. Some providers will use this same test as a disease monitoring tool if you already have osteoporosis. -All adults age 38-68 who are overweight should have a diabetes screening test once every three years.  
-Other screening tests and preventive services for persons with diabetes include: an eye exam to screen for diabetic retinopathy, a kidney function test, a foot exam, and stricter control over your cholesterol.  
-Cardiovascular screening for adults with routine risk involves an electrocardiogram (ECG) at intervals determined by your doctor.  
-Colorectal cancer screenings should be done for adults age 54-65 with no increased risk factors for colorectal cancer. There are a number of acceptable methods of screening for this type of cancer. Each test has its own benefits and drawbacks. Discuss with your doctor what is most appropriate for you during your annual wellness visit. The different tests include: colonoscopy (considered the best screening method), a fecal occult blood test, a fecal DNA test, and sigmoidoscopy. -Breast cancer screenings are recommended every other year for women of normal risk, age 54-69. 
-Cervical cancer screenings for women over age 72 are only recommended with certain risk factors.  
-All adults born between [facility] should be screened once for Hepatitis C. Here is a list of your current Health Maintenance items (your personalized list of preventive services) with a due date: 
Health Maintenance Due Topic Date Due [facility] Eye Exam  04/23/1933  Pneumococcal Vaccine (1 of 2 - PCV13) 04/23/1988 [facility] Annual Well Visit  10/17/2018  Hemoglobin A1C    05/02/2019

## 2019-07-26 NOTE — TELEPHONE ENCOUNTER
Requested Prescriptions     Pending Prescriptions Disp Refills    rosuvastatin (CRESTOR) 5 mg tablet 90 Tab 3     Sig: Take 1 Tab by mouth nightly. Patient's daughter calling to request refill on: 7.26.19      Remaining pills: 0  Last appt: 5.20.19  Next appt: 11.4.19    Pharmacy:  [place] [id number] [address]      Patient aware of 72 hour time frame. Pt's daughter states that her pharmacy has been trying to contact us for 3 days requesting a refill howver I do not see any electronic requests and am unsure if we have received any faxes from her pharmacy.  Please advise

## 2019-11-04 NOTE — PROGRESS NOTES
Prieto Mercedes is a 80 y.o. female (: 1923) presenting to address: Chief Complaint Patient presents with  Hypertension  
  follow up             flu shot declined Vitals:  
 19 8188 BP: 154/69 Pulse: 74 Resp: 18 Temp: 96.7 °F (35.9 °C) TempSrc: Oral  
SpO2: 97% Height: 5' 5\" (1.651 m) PainSc:   0 - No pain Hearing/Vision: No exam data present Learning Assessment:  
 
Learning Assessment 10/3/2014 PRIMARY LEARNER Patient HIGHEST LEVEL OF EDUCATION - PRIMARY LEARNER  GRADUATED HIGH SCHOOL OR GED  
BARRIERS PRIMARY LEARNER NONE PRIMARY LANGUAGE ENGLISH  
LEARNER PREFERENCE PRIMARY DEMONSTRATION  
ANSWERED BY PATIENT  
RELATIONSHIP OTHER Depression Screening:  
 
3 most recent PHQ Screens 2019 Little interest or pleasure in doing things Not at all Feeling down, depressed, irritable, or hopeless Not at all Total Score PHQ 2 0 Fall Risk Assessment:  
 
Fall Risk Assessment, last 12 mths 2019 Able to walk? Yes Fall in past 12 months? No  
 
Abuse Screening:  
 
Abuse Screening Questionnaire 2019 Do you ever feel afraid of your partner? Jairo Saez Are you in a relationship with someone who physically or mentally threatens you? Jairo Saez Is it safe for you to go home? Javon Avendano Coordination of Care Questionaire: 1. Have you been to the ER, urgent care clinic since your last visit? Hospitalized since your last visit? NO 
 
2. Have you seen or consulted any other health care providers outside of the [address] [place] [address] since your last visit? Include any pap smears or colon screening. NO Advanced Directive: 1. Do you have an Advanced Directive? NO 
 
2. Would you like information on Advanced Directives?  NO

## 2019-11-04 NOTE — PROGRESS NOTES
Assessment/Plan: 1. Essential hypertension 
-cont current. 
- CBC W/O DIFF; Future - METABOLIC PANEL, COMPREHENSIVE; Future - LIPID PANEL; Future 
- dilTIAZem CD (CARDIZEM CD) 240 mg ER capsule; TAKE 1 CAPSULE BY MOUTH EVERY DAY  Dispense: 90 Cap; Refill: 3 
 
2. Elevated blood sugar - AMB POC HEMOGLOBIN A1C 3. Hypoalbuminemia 
-incr protein intake with supplement powder, peanut butter, cottage cheese, etc 
- METABOLIC PANEL, COMPREHENSIVE; Future 4. Acquired hypothyroidism 
- TSH 3RD GENERATION; Future 5. CKD (chronic kidney disease) stage 3, GFR 30-59 ml/min ([facility]) - METABOLIC PANEL, COMPREHENSIVE; Future 6. Iron deficiency anemia, unspecified iron deficiency anemia type 
- IRON PROFILE; Future The plan was discussed with the patient. The patient verbalized understanding and is in agreement with the plan. All medication potential side effects were discussed with the patient. Health Maintenance:  
Health Maintenance Topic Date Due  
 EYE EXAM RETINAL OR DILATED  04[birth year]/1933  Influenza Age 5 to Adult  08/01/2019  LIPID PANEL Q1  11/02/2019  
 HEMOGLOBIN A1C Q6M  11/20/2019  Shingrix Vaccine Age 50> (1 of 2) 02/09/2020 (Originally 4/23/1973)  GLAUCOMA SCREENING Q2Y  11/04/2020 (Originally 10/16/2019)  Pneumococcal 65+ years (1 of 2 - PCV13) 11/04/2020 (Originally 4/23/1988)  FOOT EXAM Q1  01/28/2020  MICROALBUMIN Q1  01/28/2020  MEDICARE YEARLY EXAM  05/20/2020  
 DTaP/Tdap/Td series (2 - Td) 10/16/2027  Bone Densitometry (Dexa) Screening  Addressed Roz Apodaca is a 80 y.o. female and presents with Hypertension (follow up             flu shot declined) Subjective: HTN- bp elevated. Compliant with meds. Prediabetes - a1c is 6.2. Hasn't seen an eye doctor. States \"there's nothing they can do for me\". Family is concerned about wt loss. Albumin low previously.   Appetite is good.  Pt feels \"this is normal for my age\". I'm unable to get wt as pt doesn't feel she can stand independently on scale. ROS: 
Constitutional: + recent weight change. No weakness/fatigue. No f/c. Cardiovascular: No CP/palpitations. No VIEYRA/orthopnea/PND. Respiratory: No cough/sputum, dyspnea, wheezing. Gastointestinal: No dysphagia, reflux. No n/v. No constipation/diarrhea. No melena/rectal bleeding. The problem list was updated as a part of today's visit. Patient Active Problem List  
Diagnosis Code  Cholelithiasis K80.20  Hepatomegaly R16.0  
 Essential hypertension I10  
 Hypothyroidism E03.9  CKD (chronic kidney disease) stage 3, GFR 30-59 ml/min (HCC) N18.3  Elevated blood sugar R73.9  Iron deficiency anemia D50.9  Adrenal adenoma D35.00  Hypoalbuminemia E88.09  
 First degree AV block I44.0  Nonrheumatic aortic valve stenosis I35.0 The PSH, FH were reviewed. SH: Social History Tobacco Use  Smoking status: Never Smoker  Smokeless tobacco: Never Used Substance Use Topics  Alcohol use: No  
 Drug use: No  
 
 
Medications/Allergies: 
Current Outpatient Medications on File Prior to Visit Medication Sig Dispense Refill  dilTIAZem CD (CARDIZEM CD) 240 mg ER capsule TAKE 1 CAPSULE BY MOUTH EVERY DAY 90 Cap 0  
 rosuvastatin (CRESTOR) 5 mg tablet Take 1 Tab by mouth nightly. 90 Tab 3  
 levothyroxine (SYNTHROID) 100 mcg tablet Take 1 Tab by mouth Daily (before breakfast). Brand only 90 Tab 3  
 ferrous sulfate, dried (SLOW RELEASE IRON) 159 mg (45 mg iron) TbER tablet Take 1 Tab by mouth daily. 100 Tab 0  cholecalciferol (VITAMIN D3) 1,000 unit tablet Take  by mouth daily.  aspirin 81 mg chewable tablet Take 81 mg by mouth daily. No current facility-administered medications on file prior to visit. No Known Allergies Objective: 
Visit Vitals /69 (BP 1 Location: Right arm, BP Patient Position: Sitting) Pulse 74 Temp 96.7 °F (35.9 °C) (Oral) Resp 18 Ht 5' 5\" (1.651 m) SpO2 97% BMI 28.29 kg/m² Constitutional: Well developed, nourished, no distress, alert CV: S1, S2.  RRR.  3/6 RICO. No edema. Pulm: No abnormalities on inspection. Clear to auscultation bilaterally. No wheezing/rhonchi. Normal effort. GI: Soft, nontender, nondistended. Normal active bowel sounds.

## 2019-11-05 NOTE — PROGRESS NOTES
Spoke to daughter in law. They don't want to take her the specialist due to her age, etc. They don't want anything invasive. They do give her the iron pill every other day and will increase that to daily if you recommend it.

## 2019-11-05 NOTE — PROGRESS NOTES
Tell son that labs are stable. Has chronic iron def anemia. Would recommend  Gi eval as this could also be source of weight loss. Do they want me to put in referral?  Otherwise, labs good.

## 2019-11-27 NOTE — PROGRESS NOTES
Hospital Discharge Follow-Up Date/Time:  2019 2:03 PM 
 
Patient was admitted to [facility] on 19 and discharged on 19 for GI bleed. The physician discharge summary was available at the time of outreach. Patient was contacted within 1 business days of discharge. Top Challenges reviewed with the provider Patient's daughter in law reports : 
? Stools firming up ? Patient eating well Patient's daughter in law Denies : 
? Abdominal pain 
? rectal bleeding Advance Care Planning:  
Does patient have an Advance Directive:  not on file Was this a readmission? no  
Patient stated reason for the readmission: n/a Care Transition Nurse (CTN) contacted the family by telephone to perform post hospital discharge assessment. Verified name and  with family as identifiers. Provided introduction to self, and explanation of the CTN role. Family received hospital discharge instructions. CTN reviewed discharge instructions and red flags with family who verbalized understanding. Family given an opportunity to ask questions and does not have any further questions or concerns at this time. The family agrees to contact the PCP office for questions related to their healthcare. CTN provided contact information for future reference. Patients top risk factors for readmission:  medical condition, utilization of services Home Health orders at discharge: none Durable Medical Equipment ordered at discharge: none Medication(s):  
New Medications at Discharge: Iron Sulfate 325 mg two times a day Changed Medications at Discharge: none Discontinued Medications at Discharge: aspirin Medication reconciliation was performed with family, who verbalizes understanding of administration of home medications. There were barriers to obtaining medications identified at this time. Prescriptions were not electronically sent to pharmacy but patient has paper scripts Referral to Pharm D needed: no  
 
Current Outpatient Medications Medication Sig  
 ferrous sulfate 325 mg (65 mg iron) tablet Take  by mouth two (2) times a day.  dilTIAZem CD (CARDIZEM CD) 240 mg ER capsule TAKE 1 CAPSULE BY MOUTH EVERY DAY  levothyroxine (SYNTHROID) 100 mcg tablet Take 1 Tab by mouth Daily (before breakfast). Brand only  rosuvastatin (CRESTOR) 5 mg tablet Take 1 Tab by mouth nightly.  cholecalciferol (VITAMIN D3) 1,000 unit tablet Take  by mouth daily. No current facility-administered medications for this visit. Medications Discontinued During This Encounter Medication Reason  ferrous sulfate, dried (SLOW RELEASE IRON) 159 mg (45 mg iron) TbER tablet Discontinued at Discharge  aspirin 81 mg chewable tablet Discontinued at Discharge [facility] follow up appointment(s):  
Future Appointments Date Time Provider Kristine Kirk 5/4/2020  8:30 AM Alexander Bear MD [facility] follow up appointment(s): none Goals  Prevent complications post hospitalization. 1. CTN will monitor X 4 weeks 2. Ensure provider appt is scheduled within 7 days post-discharge 3. Confirm patient attended post-discharge provider apt 4. Complete post-visit call to confirm attendance and update care needs 5. Review/educate common or potential \"red flags\" of condition worsening 6. Evaluate adherence to medications and priority barriers to resolve 7. Instruct on adherence to medications as ordered and assess for therapeutic response and side-effects 8. Discuss and evaluate ADL performance. Provide recommendations on energy conservation, particularly related to transition home from an inpatient admission. Alexandra Trujillo BSN, RN, [employer] Care Transitions Nurse [phone]

## 2019-12-03 PROBLEM — K92.2 GIB (GASTROINTESTINAL BLEEDING): Status: ACTIVE | Noted: 2019-01-01

## 2019-12-04 NOTE — PROGRESS NOTES
Hospital Discharge Follow-Up Date/Time:  2019 11:28 AM 
 
Patient was admitted to [facility] on 19 and discharged on 12/3/19 for GI bleed and blood loss anemia. The physician discharge summary was available at the time of outreach. Patient was contacted within 1 business days of discharge. Top Challenges reviewed with the provider Patient's family reports : 
? Patient doing fine ? Was fin through thanks giving woke up on 19 with bloody stools again ? Found to have hemaglobin below 7 and needed blood ? They told us that if it does not happen in the hospital there is not much they can do. Patient Denies : 
? Bleeding at this time Advance Care Planning:  
Does patient have an Advance Directive:  not on file Was this a readmission? yes Patient stated reason for the readmission: rectal bleeding and needed blood Care Transition Nurse (CTN) contacted the family by telephone to perform post hospital discharge assessment. Verified name and  with family as identifiers. Provided introduction to self, and explanation of the CTN role. Family received hospital discharge instructions. CTN reviewed discharge instructions and red flags with family who verbalized understanding. Family given an opportunity to ask questions and does not have any further questions or concerns at this time. The family agrees to contact the PCP office for questions related to their healthcare. CTN provided contact information for future reference. Patients top risk factors for readmission:  medical condition, multi health system providers, utilization of services Home Health orders at discharge: none Durable Medical Equipment ordered at discharge: none Medication(s):  
New Medications at Discharge: none Changed Medications at Discharge: none Discontinued Medications at Discharge: none Medication reconciliation was performed with family, who verbalizes understanding of administration of home medications. There were no barriers to obtaining medications identified at this time. Referral to Pharm D needed: no  
 
Current Outpatient Medications Medication Sig  
 ferrous sulfate 325 mg (65 mg iron) tablet Take  by mouth two (2) times a day.  dilTIAZem CD (CARDIZEM CD) 240 mg ER capsule TAKE 1 CAPSULE BY MOUTH EVERY DAY  levothyroxine (SYNTHROID) 100 mcg tablet Take 1 Tab by mouth Daily (before breakfast). Brand only  rosuvastatin (CRESTOR) 5 mg tablet Take 1 Tab by mouth nightly.  cholecalciferol (VITAMIN D3) 1,000 unit tablet Take  by mouth daily. No current facility-administered medications for this visit. There are no discontinued medications. [facility] follow up appointment(s):  
Future Appointments Date Time Provider Kristine Kirk 5/4/2020  8:30 AM Sonal Bear MD [facility] follow up appointment(s): none Goals  Prevent complications post hospitalization. 1. CTN will monitor X 4 weeks 2. Ensure provider appt is scheduled within 7 days post-discharge 3. Confirm patient attended post-discharge provider apt 4. Complete post-visit call to confirm attendance and update care needs 5. Review/educate common or potential \"red flags\" of condition worsening 6. Evaluate adherence to medications and priority barriers to resolve 7. Instruct on adherence to medications as ordered and assess for therapeutic response and side-effects 8. Discuss and evaluate ADL performance. Provide recommendations on energy conservation, particularly related to transition home from an inpatient admission. Ana Luisa Trujillo BSN, RN, [employer] Care Transitions Nurse [phone]

## 2019-12-11 NOTE — PROGRESS NOTES
Call placed to patient. When asked how she was feeling she stated that she felt much better. Her daughter in law was out \"doing the shopping\"  CTN made arrangements to call tomorrow for completion of follow up.

## 2019-12-16 NOTE — PROGRESS NOTES
Assessment/Plan: 1. Lower GI bleed 
-suspected to be diverticular. Pt declined further eval. Increase fiber. 2. Acute blood loss anemia 
-stable. Dago hgb. High iron food.s 3. Hospital discharge follow-up 
-stable The plan was discussed with the patient. The patient verbalized understanding and is in agreement with the plan. All medication potential side effects were discussed with the patient. Health Maintenance:  
Health Maintenance Topic Date Due  
 EYE EXAM RETINAL OR DILATED  04/23/1933  Influenza Age 5 to Adult  08/01/2019  Shingrix Vaccine Age 50> (1 of 2) 02/09/2020 (Originally 4/23/1973)  GLAUCOMA SCREENING Q2Y  11/04/2020 (Originally 10/16/2019)  Pneumococcal 65+ years (1 of 1 - PPSV23) 11/04/2020 (Originally 4/23/1988)  FOOT EXAM Q1  01/28/2020  MICROALBUMIN Q1  01/28/2020  
 HEMOGLOBIN A1C Q6M  05/04/2020  MEDICARE YEARLY EXAM  05/20/2020  LIPID PANEL Q1  11/04/2020  
 DTaP/Tdap/Td series (2 - Td) 10/16/2027  Bone Densitometry (Dexa) Screening  Addressed Malachi March is a 80 y.o. female and presents with Hospital Follow Up (GI Bleed) Subjective: 
Pt recently hospitalized for LGIB- suspected to be diverticular in origin. She declined evaluation/colonoscopy. She was transfused 2 units PRBC, with hgb 8.8 upon discharge. Ms. Malachi March is a 80y.o. year old female, she is seen today for Transition of Care services following a hospital discharge for GIB on 12/3/19. Our office Nurse Navigator performed an outreach to Ms. Edison Lu on 12/20/19 (within 2 business days of discharge) to complete medication reconciliation and a telephonic assessment of her condition. No further bleeding. The family members are present are particularly confrontational (the daugther-in-law). Stating that \"no one ever told her she had diverticular disease\". ROS: 
Constitutional: No recent weight change. No weakness/fatigue. No f/c. Cardiovascular: No CP/palpitations. No VIEYRA/orthopnea/PND. Respiratory: No cough/sputum, dyspnea, wheezing. Gastointestinal: No dysphagia, reflux. No n/v. No constipation/diarrhea. No melena/rectal bleeding. Neurological: No seizures/numbness/weakness. No paresthesias. The problem list was updated as a part of today's visit. Patient Active Problem List  
Diagnosis Code  Cholelithiasis K80.20  Hepatomegaly R16.0  
 Essential hypertension I10  
 Hypothyroidism E03.9  CKD (chronic kidney disease) stage 3, GFR 30-59 ml/min (HCC) N18.3  Elevated blood sugar R73.9  Iron deficiency anemia D50.9  Adrenal adenoma D35.00  Hypoalbuminemia E88.09  
 First degree AV block I44.0  Nonrheumatic aortic valve stenosis I35.0  
 GIB (gastrointestinal bleeding) K92.2 The PSH, FH were reviewed. SH: Social History Tobacco Use  Smoking status: Never Smoker  Smokeless tobacco: Never Used Substance Use Topics  Alcohol use: No  
 Drug use: No  
 
 
Medications/Allergies: 
Current Outpatient Medications on File Prior to Visit Medication Sig Dispense Refill  ferrous sulfate 325 mg (65 mg iron) tablet Take  by mouth two (2) times a day.  dilTIAZem CD (CARDIZEM CD) 240 mg ER capsule TAKE 1 CAPSULE BY MOUTH EVERY DAY 90 Cap 3  
 levothyroxine (SYNTHROID) 100 mcg tablet Take 1 Tab by mouth Daily (before breakfast). Brand only 90 Tab 3  
 rosuvastatin (CRESTOR) 5 mg tablet Take 1 Tab by mouth nightly. 90 Tab 3  cholecalciferol (VITAMIN D3) 1,000 unit tablet Take  by mouth daily. No current facility-administered medications on file prior to visit. No Known Allergies Objective: 
Visit Vitals /61 (BP 1 Location: Left arm, BP Patient Position: Sitting) Pulse 67 Temp 97.1 °F (36.2 °C) (Oral) Resp 16 Ht 5' 5\" (1.651 m) SpO2 98% BMI 28.29 kg/m² Constitutional: Well developed, nourished, no distress, alert Eyes: Conjunctiva normal.  Eyelids normal.  
CV: S1, S2.  RRR. No murmurs/rubs. Pulm: No abnormalities on inspection. Clear to auscultation bilaterally. No wheezing/rhonchi. Normal effort. GI: Soft, nontender, nondistended. Normal active bowel sounds. Labwork and Ancillary Studies: CBC w/Diff Lab Results Component Value Date/Time WBC 7.3 11/04/2019 08:38 AM  
 HGB 11.4 (L) 11/04/2019 08:38 AM  
 PLATELET 732 70/64/4909 08:38 AM  
  
 
 Basic Metabolic Profile/LFTs Lab Results Component Value Date/Time Sodium 144 11/04/2019 08:38 AM  
 Potassium 4.4 11/04/2019 08:38 AM  
 Chloride 115 (H) 11/04/2019 08:38 AM  
 CO2 23 11/04/2019 08:38 AM  
 Anion gap 6 11/04/2019 08:38 AM  
 Glucose 131 (H) 11/04/2019 08:38 AM  
 BUN 34 (H) 11/04/2019 08:38 AM  
 Creatinine 1.52 (H) 11/04/2019 08:38 AM  
 BUN/Creatinine ratio 22 (H) 11/04/2019 08:38 AM  
 GFR est AA 38 (L) 11/04/2019 08:38 AM  
 GFR est non-AA 32 (L) 11/04/2019 08:38 AM  
 Calcium 8.8 11/04/2019 08:38 AM  
  
Lab Results Component Value Date/Time ALT (SGPT) 15 11/04/2019 08:38 AM  
 AST (SGOT) 11 11/04/2019 08:38 AM  
 Alk. phosphatase 85 11/04/2019 08:38 AM  
 Bilirubin, total 0.9 11/04/2019 08:38 AM  
 
 
Cholesterol Lab Results Component Value Date/Time  Cholesterol, total 135 11/04/2019 08:38 AM  
 HDL Cholesterol 76 (H) 11/04/2019 08:38 AM  
 LDL, calculated 44.2 11/04/2019 08:38 AM  
 Triglyceride 74 11/04/2019 08:38 AM  
 CHOL/HDL Ratio 1.8 11/04/2019 08:38 AM

## 2019-12-16 NOTE — PROGRESS NOTES
Barb Santoro is a 80 y.o. female (: 1923) presenting to address: Chief Complaint Patient presents with  
[facility] Follow Up  
  GI Bleed Vitals:  
 19 1127 BP: 125/61 Pulse: 67 Resp: 16 Temp: 97.1 °F (36.2 °C) TempSrc: Oral  
SpO2: 98% Height: 5' 5\" (1.651 m) PainSc:   0 - No pain Hearing/Vision: No exam data present Learning Assessment:  
 
Learning Assessment 10/3/2014 PRIMARY LEARNER Patient HIGHEST LEVEL OF EDUCATION - PRIMARY LEARNER  GRADUATED HIGH SCHOOL OR GED  
BARRIERS PRIMARY LEARNER NONE PRIMARY LANGUAGE ENGLISH  
LEARNER PREFERENCE PRIMARY DEMONSTRATION  
ANSWERED BY PATIENT  
RELATIONSHIP OTHER Depression Screening:  
 
3 most recent PHQ Screens 2019 Little interest or pleasure in doing things Not at all Feeling down, depressed, irritable, or hopeless Not at all Total Score PHQ 2 0 Fall Risk Assessment:  
 
Fall Risk Assessment, last 12 mths 2019 Able to walk? No  
Fall in past 12 months? No  
 
Abuse Screening:  
 
Abuse Screening Questionnaire 2019 Do you ever feel afraid of your partner? Lyndee Paci Are you in a relationship with someone who physically or mentally threatens you? Marianne Paci Is it safe for you to go home? Jimmy Costa Coordination of Care Questionaire: 1. Have you been to the ER, urgent care clinic since your last visit? Hospitalized since your last visit? YES 
 
2. Have you seen or consulted any other health care providers outside of the [address] [place] [address] since your last visit? Include any pap smears or colon screening. NO Advanced Directive: 1. Do you have an Advanced Directive? YES 
 
2. Would you like information on Advanced Directives?  NO

## 2019-12-16 NOTE — PATIENT INSTRUCTIONS
Diverticulosis: Care Instructions Your Care Instructions In diverticulosis, pouches called diverticula form in the wall of the large intestine (colon). The pouches do not cause any pain or other symptoms. Most people who have diverticulosis do not know they have it. But the pouches sometimes bleed, and if they become infected, they can cause pain and other symptoms. When this happens, it is called diverticulitis. Diverticula form when pressure pushes the wall of the colon outward at certain weak points. A diet that is too low in fiber can cause diverticula. Follow-up care is a key part of your treatment and safety. Be sure to make and go to all appointments, and call your doctor if you are having problems. It's also a good idea to know your test results and keep a list of the medicines you take. How can you care for yourself at home? · Include fruits, leafy green vegetables, beans, and whole grains in your diet each day. These foods are high in fiber. · Take a fiber supplement, such as Citrucel or Metamucil, every day if needed. Read and follow all instructions on the label. · Drink plenty of fluids, enough so that your urine is light yellow or clear like water. If you have kidney, heart, or liver disease and have to limit fluids, talk with your doctor before you increase the amount of fluids you drink. · Get at least 30 minutes of exercise on most days of the week. Walking is a good choice. You also may want to do other activities, such as running, swimming, cycling, or playing tennis or team sports. · Cut out foods that cause gas, pain, or other symptoms. When should you call for help? Call your doctor now or seek immediate medical care if: 
  · You have belly pain.  
  · You pass maroon or very bloody stools.  
  · You have a fever.  
  · You have nausea and vomiting.  
  · You have unusual changes in your bowel movements or abdominal swelling.  
  · You have burning pain when you urinate.   · You have abnormal vaginal discharge.  
  · You have shoulder pain.  
  · You have cramping pain that does not get better when you have a bowel movement or pass gas.  
  · You pass gas or stool from your urethra while urinating.  
 Watch closely for changes in your health, and be sure to contact your doctor if you have any problems. Where can you learn more? Go to http://darcy-toya.info/. Enter U573 in the search box to learn more about \"Diverticulosis: Care Instructions. \" Current as of: November 7, 2018 Content Version: 12.2 © 3742-3648 [employer]. Care instructions adapted under license by [employer] (which disclaims liability or warranty for this information). If you have questions about a medical condition or this instruction, always ask your healthcare professional. [address] any warranty or liability for your use of this information.

## 2019-12-17 NOTE — TELEPHONE ENCOUNTER
Saniya Calles [facility] nurse called to inform us that patient and family refused care. Family states pt \"is fine\" and refused all home care services offered.

## 2020-01-01 ENCOUNTER — PATIENT OUTREACH (OUTPATIENT)
Dept: CASE MANAGEMENT | Age: 85
End: 2020-01-01

## 2020-01-01 ENCOUNTER — TELEPHONE (OUTPATIENT)
Dept: FAMILY MEDICINE CLINIC | Age: 85
End: 2020-01-01

## 2020-01-01 ENCOUNTER — PATIENT OUTREACH (OUTPATIENT)
Dept: FAMILY MEDICINE CLINIC | Age: 85
End: 2020-01-01

## 2020-01-01 ENCOUNTER — OFFICE VISIT (OUTPATIENT)
Dept: FAMILY MEDICINE CLINIC | Age: 85
End: 2020-01-01

## 2020-01-01 VITALS
BODY MASS INDEX: 28.29 KG/M2 | OXYGEN SATURATION: 95 % | SYSTOLIC BLOOD PRESSURE: 146 MMHG | HEIGHT: 65 IN | DIASTOLIC BLOOD PRESSURE: 67 MMHG | RESPIRATION RATE: 16 BRPM | HEART RATE: 85 BPM | TEMPERATURE: 98.1 F

## 2020-01-01 DIAGNOSIS — K92.2 GASTROINTESTINAL HEMORRHAGE, UNSPECIFIED GASTROINTESTINAL HEMORRHAGE TYPE: ICD-10-CM

## 2020-01-01 DIAGNOSIS — E03.9 ACQUIRED HYPOTHYROIDISM: ICD-10-CM

## 2020-01-01 DIAGNOSIS — N18.30 CKD (CHRONIC KIDNEY DISEASE) STAGE 3, GFR 30-59 ML/MIN (HCC): ICD-10-CM

## 2020-01-01 DIAGNOSIS — N39.0 URINARY TRACT INFECTION DUE TO PROTEUS: ICD-10-CM

## 2020-01-01 DIAGNOSIS — D50.9 IRON DEFICIENCY ANEMIA, UNSPECIFIED IRON DEFICIENCY ANEMIA TYPE: Primary | ICD-10-CM

## 2020-01-01 DIAGNOSIS — I50.32 DIASTOLIC CHF, CHRONIC (HCC): ICD-10-CM

## 2020-01-01 DIAGNOSIS — R73.9 ELEVATED BLOOD SUGAR: ICD-10-CM

## 2020-01-01 DIAGNOSIS — R53.81 PHYSICAL DECONDITIONING: Primary | ICD-10-CM

## 2020-01-01 DIAGNOSIS — B96.4 URINARY TRACT INFECTION DUE TO PROTEUS: ICD-10-CM

## 2020-01-01 DIAGNOSIS — I50.32 DIASTOLIC CHF, CHRONIC (HCC): Primary | ICD-10-CM

## 2020-01-01 DIAGNOSIS — N18.30 CKD (CHRONIC KIDNEY DISEASE) STAGE 3, GFR 30-59 ML/MIN (HCC): Primary | ICD-10-CM

## 2020-01-01 RX ORDER — CEPHALEXIN 500 MG/1
500 CAPSULE ORAL EVERY 12 HOURS
COMMUNITY
Start: 2020-01-01 | End: 2020-01-01

## 2020-01-01 RX ORDER — POTASSIUM CHLORIDE 20 MEQ/1
20 TABLET, EXTENDED RELEASE ORAL
Qty: 90 TAB | Refills: 0 | Status: SHIPPED | OUTPATIENT
Start: 2020-01-01

## 2020-01-01 RX ORDER — LEVOTHYROXINE SODIUM 112 UG/1
112 TABLET ORAL
Qty: 90 TAB | Refills: 0 | Status: SHIPPED | OUTPATIENT
Start: 2020-01-01

## 2020-01-01 RX ORDER — FUROSEMIDE 20 MG/1
20 TABLET ORAL DAILY
COMMUNITY
Start: 2020-01-01 | End: 2020-01-01

## 2020-01-01 RX ORDER — LANOLIN ALCOHOL/MO/W.PET/CERES
325 CREAM (GRAM) TOPICAL 2 TIMES DAILY
Qty: 180 TAB | Refills: 3 | Status: SHIPPED | OUTPATIENT
Start: 2020-01-01

## 2020-01-01 RX ORDER — FUROSEMIDE 20 MG/1
20 TABLET ORAL
Qty: 90 TAB | Refills: 0
Start: 2020-01-01 | End: 2020-01-01

## 2020-01-01 RX ORDER — POTASSIUM CHLORIDE 20 MEQ/1
20 TABLET, EXTENDED RELEASE ORAL DAILY
COMMUNITY
Start: 2020-01-01 | End: 2020-01-01

## 2020-01-01 RX ORDER — FUROSEMIDE 40 MG/1
40 TABLET ORAL DAILY
Qty: 90 TAB | Refills: 0 | Status: SHIPPED | OUTPATIENT
Start: 2020-01-01 | End: 2020-01-01

## 2020-01-01 RX ORDER — MIDODRINE HYDROCHLORIDE 10 MG/1
TABLET ORAL 3 TIMES DAILY
COMMUNITY
End: 2020-01-01 | Stop reason: ALTCHOICE

## 2020-01-01 RX ORDER — FUROSEMIDE 20 MG/1
20 TABLET ORAL DAILY
Qty: 90 TAB | Refills: 0 | Status: SHIPPED | OUTPATIENT
Start: 2020-01-01

## 2020-01-01 RX ORDER — CEPHALEXIN 500 MG/1
500 CAPSULE ORAL 4 TIMES DAILY
COMMUNITY

## 2020-01-08 NOTE — PROGRESS NOTES
Patient has graduated from the Transitions of Care Coordination  program on 1/8/20. Contacted patient for transition of care follow up. Patient denied any further bleeding and stated, \"I'm doing fine\". Patient's symptoms are stable at this time. Patient/family has the ability to self-manage. Care management goals have been completed at this time. No further care transitions nurse follow up scheduled. Goals Addressed                 This Visit's Progress     COMPLETED: Prevent complications post hospitalization. On track     1. CTN will monitor X 4 weeks    2. Ensure provider appt is scheduled within 7 days post-discharge    3. Confirm patient attended post-discharge provider apt    4. Complete post-visit call to confirm attendance and update care needs      5. Review/educate common or potential \"red flags\" of condition worsening    6. Evaluate adherence to medications and priority barriers to resolve        7. Instruct on adherence to medications as ordered and assess for therapeutic response and side-effects         8. Discuss and evaluate ADL performance. Provide recommendations on energy conservation, particularly related to transition home from an inpatient admission. No admissions post 30 days from discharge of 12/3/19. Patient has care transitions nurse contact information for any further questions, concerns, or needs.   Patient's upcoming visits:    Future Appointments   Date Time Provider Kristine Kirk   3/16/2020  9:00 AM [facility]   5/4/2020  8:30 AM Jb Bear MD [facility]

## 2020-02-11 NOTE — PROGRESS NOTES
[facility] Discharge Follow-Up Date/Time:  2020 3:04 PM 
 
Patient was admitted to [facility] on 20 and discharged on 2/10/20 for new on set CHF. The physician discharge summary was available at the time of outreach. Patient was contacted within 1 business days of discharge. Top Challenges reviewed with the provider Patient's famoly reports : 
? Skintear improving ? Edema better Patient's family Denies : 
? SOB 
? Chest pain ? Advance Care Planning:  
Does patient have an Advance Directive:  not on file Was this a readmission? no  
Patient stated reason for the readmission: n/a Care Transition Nurse (CTN) contacted the family by telephone to perform post hospital discharge assessment. Verified name and  with family as identifiers. Provided introduction to self, and explanation of the CTN role. Family received hospital discharge instructions. CTN reviewed discharge instructions and red flags with family who verbalized understanding. Family given an opportunity to ask questions and does not have any further questions or concerns at this time. The family agrees to contact the PCP office for questions related to their healthcare. CTN provided contact information for future reference. Patients top risk factors for readmission:  no barriers to care Home Health orders at discharge: resumption of care Home Health company: [facility] home health Date of initial visit: 20 Durable Medical Equipment ordered at discharge: none Medication(s):  
New Medications at Discharge:  
cephALEXin (KEFLEX) 500 mg PO CAPS   Take 1 Cap by Mouth Every 12 hours for 5 days. 10 Cap   0 02/10/2020 02/15/2020  
potassium chloride ER (K-DUR;KLOR-CON) 20 mEq PO tablet   Take 1 Tab by Mouth Once a Day. 30 Tab   1 02/10/2020    
furosemide (LASIX) 20 mg PO TABS   Take 1 Tab by Mouth Once a Day. 30 Tab   0 02/10/2020    
 
 
Changed Medications at Discharge:none Discontinued Medications at Discharge:  
rosuvastatin (CRESTOR) 5 mg PO TABS   Take 5 mg by Mouth Every Night at Bedtime.   0   02/10/2020 Discontinued (Therapy completed) diltiazem 24HR (CARDIZEM CD) 240 mg PO CP24   Take 1 Cap by Mouth Once a Day. 30 Cap   2 05/08/2013 02/10/2020 Discontinued (Therapy completed) Medication reconciliation was performed with patient, who verbalizes understanding of administration of home medications. There were no barriers to obtaining medications identified at this time. Referral to Pharm D needed: no  
 
Current Outpatient Medications Medication Sig  cephALEXin (KEFLEX) 500 mg capsule Take 500 mg by mouth every twelve (12) hours every twelve (12) hours.  furosemide (LASIX) 20 mg tablet Take 20 mg by mouth daily.  potassium chloride (K-DUR, KLOR-CON) 20 mEq tablet Take 20 mEq by mouth daily.  ferrous sulfate 325 mg (65 mg iron) tablet Take  by mouth two (2) times a day.  levothyroxine (SYNTHROID) 100 mcg tablet Take 1 Tab by mouth Daily (before breakfast). Brand only  cholecalciferol (VITAMIN D3) 1,000 unit tablet Take  by mouth daily. No current facility-administered medications for this visit. Medications Discontinued During This Encounter Medication Reason  dilTIAZem CD (CARDIZEM CD) 240 mg ER capsule Discontinued at Discharge  rosuvastatin (CRESTOR) 5 mg tablet Discontinued at Discharge [facility] follow up appointment(s):  
Future Appointments Date Time Provider Kristine Kirk 2/24/2020  1:00 PM Elisa Bear MD [facility]  
5/4/2020  8:30 AM Elisa Bear MD [facility] follow up appointment(s): none Goals Addressed This Visit's Progress  Prevent complications post hospitalization. 1. CTN will monitor X 4 weeks 2. Ensure provider appt is scheduled within 7 days post-discharge PCP appointment  2/24/2020 1:00 PM 
 3. Confirm patient attended post-discharge provider apt 4. Complete post-visit call to confirm attendance and update care needs 5. Review/educate common or potential \"red flags\" of condition worsening 
 weight gain of 2 pounds in 24 hours 3 pounds in 3 days or 4- 5 pounds in one week. Severe shortness of breath Swelling of lower extremeties Swelling in abdomen Loss of appetite Lightheaded or dizziness 6. Evaluate adherence to medications and priority barriers to resolve 7. Instruct on adherence to medications as ordered and assess for therapeutic response and side-effects 8. Discuss and evaluate ADL performance. Provide recommendations on energy conservation, particularly related to transition home from an inpatient admission. Tapan Trujillo BSN, RN, [employer] Care Transitions Nurse [phone]

## 2020-02-14 PROBLEM — I50.32 DIASTOLIC CHF, CHRONIC (HCC): Status: ACTIVE | Noted: 2020-01-01

## 2020-02-24 NOTE — PATIENT INSTRUCTIONS
Diverticulosis: Care Instructions Your Care Instructions In diverticulosis, pouches called diverticula form in the wall of the large intestine (colon). The pouches do not cause any pain or other symptoms. Most people who have diverticulosis do not know they have it. But the pouches sometimes bleed, and if they become infected, they can cause pain and other symptoms. When this happens, it is called diverticulitis. Diverticula form when pressure pushes the wall of the colon outward at certain weak points. A diet that is too low in fiber can cause diverticula. Follow-up care is a key part of your treatment and safety. Be sure to make and go to all appointments, and call your doctor if you are having problems. It's also a good idea to know your test results and keep a list of the medicines you take. How can you care for yourself at home? · Include fruits, leafy green vegetables, beans, and whole grains in your diet each day. These foods are high in fiber. · Take a fiber supplement, such as Citrucel or Metamucil, every day if needed. Read and follow all instructions on the label. · Drink plenty of fluids, enough so that your urine is light yellow or clear like water. If you have kidney, heart, or liver disease and have to limit fluids, talk with your doctor before you increase the amount of fluids you drink. · Get at least 30 minutes of exercise on most days of the week. Walking is a good choice. You also may want to do other activities, such as running, swimming, cycling, or playing tennis or team sports. · Cut out foods that cause gas, pain, or other symptoms. When should you call for help? Call your doctor now or seek immediate medical care if: 
  · You have belly pain.  
  · You pass maroon or very bloody stools.  
  · You have a fever.  
  · You have nausea and vomiting.  
  · You have unusual changes in your bowel movements or abdominal swelling.  
  · You have burning pain when you urinate.   · You have abnormal vaginal discharge.  
  · You have shoulder pain.  
  · You have cramping pain that does not get better when you have a bowel movement or pass gas.  
  · You pass gas or stool from your urethra while urinating.  
 Watch closely for changes in your health, and be sure to contact your doctor if you have any problems. Where can you learn more? Go to http://darcy-toya.info/. Enter N481 in the search box to learn more about \"Diverticulosis: Care Instructions. \" Current as of: November 7, 2018 Content Version: 12.2 © 9555-1042 [employer]. Care instructions adapted under license by [employer] (which disclaims liability or warranty for this information). If you have questions about a medical condition or this instruction, always ask your healthcare professional. [address] any warranty or liability for your use of this information.

## 2020-02-24 NOTE — PROGRESS NOTES
Keshia Simon is a 80 y.o. female (: 1923) presenting to address: Chief Complaint Patient presents with  Diabetes  
  follow up Vitals:  
 20 1320 BP: 146/67 Pulse: 85 Resp: 16 Temp: 98.1 °F (36.7 °C) TempSrc: Oral  
SpO2: 95% Height: 5' 5\" (1.651 m) PainSc:   0 - No pain Hearing/Vision: No exam data present Learning Assessment:  
 
Learning Assessment 10/3/2014 PRIMARY LEARNER Patient HIGHEST LEVEL OF EDUCATION - PRIMARY LEARNER  GRADUATED HIGH SCHOOL OR GED  
BARRIERS PRIMARY LEARNER NONE PRIMARY LANGUAGE ENGLISH  
LEARNER PREFERENCE PRIMARY DEMONSTRATION  
ANSWERED BY PATIENT  
RELATIONSHIP OTHER Depression Screening:  
 
3 most recent PHQ Screens 2020 Little interest or pleasure in doing things Not at all Feeling down, depressed, irritable, or hopeless Not at all Total Score PHQ 2 0 Fall Risk Assessment:  
 
Fall Risk Assessment, last 12 mths 2020 Able to walk? No  
Fall in past 12 months? No  
 
Abuse Screening:  
 
Abuse Screening Questionnaire 2019 Do you ever feel afraid of your partner? Luis Waller Are you in a relationship with someone who physically or mentally threatens you? Luis Waller Is it safe for you to go home? Emily Gupta Coordination of Care Questionaire: 1. Have you been to the ER, urgent care clinic since your last visit? Hospitalized since your last visit? YES 
 
2. Have you seen or consulted any other health care providers outside of the [address] [place] [address] since your last visit? Include any pap smears or colon screening. NO Advanced Directive: 1. Do you have an Advanced Directive? YES 
 
2. Would you like information on Advanced Directives?  NO

## 2020-02-24 NOTE — PROGRESS NOTES
Assessment/Plan: 1. Iron deficiency anemia, unspecified iron deficiency anemia type 
-cont iron 
- CBC W/O DIFF; Future 2. Gastrointestinal hemorrhage, unspecified gastrointestinal hemorrhage type 
-pt continues to decline intervention, only wishes supportive care for this 3. Urinary tract infection due to Proteus 
-finish keflex 4. Diastolic CHF, chronic (HCC) 
-compensated. Lasix prn.  
 
5. Elevated blood sugar 
- HEMOGLOBIN A1C W/O EAG; Future - METABOLIC PANEL, COMPREHENSIVE; Future - MICROALBUMIN, UR, RAND W/ MICROALB/CREAT RATIO; Future 6. Acquired hypothyroidism 
-dose incr in hospital 
- TSH 3RD GENERATION; Future The plan was discussed with the patient. The patient verbalized understanding and is in agreement with the plan. All medication potential side effects were discussed with the patient. Health Maintenance:  
Health Maintenance Topic Date Due  Eye Exam Retinal or Dilated  04/23/1933  Shingrix Vaccine Age 50> (1 of 2) 04/23/1973  Influenza Age 5 to Adult  08/01/2019  Foot Exam Q1  01/28/2020  MICROALBUMIN Q1  01/28/2020  GLAUCOMA SCREENING Q2Y  11/04/2020 (Originally 10/16/2019)  Pneumococcal 65+ years (1 of 1 - PPSV23) 11/04/2020 (Originally 4/23/1988)  Medicare Yearly Exam  05/20/2020  
 DTaP/Tdap/Td series (2 - Td) 10/16/2027  Bone Densitometry (Dexa) Screening  Addressed Dorothy Mckoy is a 80 y.o. female and presents with Diabetes (follow up) Subjective: 
Ms. Dorothy Mckoy is a 80y.o. year old female, she is seen today for Transition of Care services following a hospital discharge for GIB on 2/10 and 2/21. Our office Nurse Navigator performed an outreach to Ms. Marlene Carey on 2/11 (within 2 business days of discharge) to complete medication reconciliation and a telephonic assessment of her condition. Pt was in hospital 2/21 for recurrent GI bleed thought to be diverticular. Has previously declined intervention. She was transfused with hgb at 8.3 upon discharge. She was also treated for proteus UTI. She does have h/o htn, but was hypotensive in hospital (likely from hypovolemia from bleed). No further episodes of bleeding. She still feels weak and fatigued. No dyspnea. Has home PT scheduled. Family is trying to increase fiber intake. She was also hospitalized 7/7-0/11 for diastolic CHF exacerbation. She was placed on lasix for this. Only sx was swelling in legs. No dyspnea currently. ROS: 
Constitutional: No recent weight change. No weakness/fatigue. No f/c. Cardiovascular: No CP/palpitations. No VIEYRA/orthopnea/PND. Respiratory: No cough/sputum, dyspnea, wheezing. Gastointestinal: No dysphagia, reflux. No n/v. No constipation/diarrhea.  +rectal bleeding. Psychiatric:  No depression, anxiety. The problem list was updated as a part of today's visit. Patient Active Problem List  
Diagnosis Code  Cholelithiasis K80.20  Hepatomegaly R16.0  
 Essential hypertension I10  
 Hypothyroidism E03.9  CKD (chronic kidney disease) stage 3, GFR 30-59 ml/min (HCC) N18.3  Elevated blood sugar R73.9  Iron deficiency anemia D50.9  Adrenal adenoma D35.00  Hypoalbuminemia E88.09  
 First degree AV block I44.0  Nonrheumatic aortic valve stenosis I35.0  
 GIB (gastrointestinal bleeding) K92.2  Diastolic CHF, chronic (HCC) I50.32 The PSH, FH were reviewed. SH: Social History Tobacco Use  Smoking status: Former Smoker Types: Cigarettes  Smokeless tobacco: Never Used Substance Use Topics  Alcohol use: No  
 Drug use: No  
 
 
Medications/Allergies: 
Current Outpatient Medications on File Prior to Visit Medication Sig Dispense Refill  furosemide (LASIX) 20 mg tablet Take 20 mg by mouth daily.  potassium chloride (K-DUR, KLOR-CON) 20 mEq tablet Take 20 mEq by mouth daily.  ferrous sulfate 325 mg (65 mg iron) tablet Take  by mouth two (2) times a day.  levothyroxine (SYNTHROID) 100 mcg tablet Take 1 Tab by mouth Daily (before breakfast). Brand only 90 Tab 3  cholecalciferol (VITAMIN D3) 1,000 unit tablet Take  by mouth daily. No current facility-administered medications on file prior to visit. No Known Allergies Objective: 
Visit Vitals /67 (BP 1 Location: Left arm, BP Patient Position: Sitting) Pulse 85 Temp 98.1 °F (36.7 °C) (Oral) Resp 16 Ht 5' 5\" (1.651 m) SpO2 95% BMI 28.29 kg/m² Constitutional: Well developed, nourished, no distress, alert, obese habitus CV: S1, S2.  RRR. No murmurs/rubs. No edema. Pulm: No abnormalities on inspection. Clear to auscultation bilaterally. No wheezing/rhonchi. Normal effort. GI: Soft, nontender, nondistended. Normal active bowel sounds.

## 2020-02-24 NOTE — PROGRESS NOTES
Hospital Discharge Follow-Up Date/Time:  2020 3:01 PM 
 
Patient was admitted to [facility] on 20 and discharged on 20 for GI bleed. The physician discharge summary was available at the time of outreach. Patient was contacted within 1 business days of discharge. Top Challenges reviewed with the provider Patient's family reports : 
? Patient doing well 
? Skin tear improving and daughter in law was going to change the bandage later today and then would decide if it needed a new dressing or if it was healed. ? Saw PCP today and She stopped the midodrine for fear that it monica increase her blood pressure too much. Patient's family Denies : 
? Bleeding at this time Advance Care Planning:  
Does patient have an Advance Directive:  not on file Was this a readmission? yes Patient stated reason for the readmission: bloody stools and UTI Care Transition Nurse (CTN) contacted the family by telephone to perform post hospital discharge assessment. Verified name and  with family as identifiers. Provided introduction to self, and explanation of the CTN role. Family received hospital discharge instructions. CTN reviewed discharge instructions and red flags with family who verbalized understanding. Family given an opportunity to ask questions and does not have any further questions or concerns at this time. The family agrees to contact the PCP office for questions related to their healthcare. CTN provided contact information for future reference. Patients top risk factors for readmission:  functional physical ability, medical condition Home Health orders at discharge: none Durable Medical Equipment ordered at discharge: none Medication(s):  
New Medications at Discharge: Midodrine 10 mg 3 times a day - PCP stopped medication at [facility] appointment Changed Medications at Discharge: Keflex 500 mg every 12 hours for 5 days Discontinued Medications at Discharge: none Medication reconciliation was performed with family, who verbalizes understanding of administration of home medications. There were no barriers to obtaining medications identified at this time. Referral to Pharm D needed: no  
 
Current Outpatient Medications Medication Sig  cephALEXin (KEFLEX) 500 mg capsule Take 500 mg by mouth four (4) times daily.  ferrous sulfate 325 mg (65 mg iron) tablet Take 1 Tab by mouth two (2) times a day.  furosemide (LASIX) 20 mg tablet Take 1 Tab by mouth daily as needed (edema).  levothyroxine (SYNTHROID) 112 mcg tablet Take 1 Tab by mouth Daily (before breakfast). Brand only  potassium chloride (K-DUR, KLOR-CON) 20 mEq tablet Take 1 Tab by mouth daily as needed (with lasix).  cholecalciferol (VITAMIN D3) 1,000 unit tablet Take  by mouth daily. No current facility-administered medications for this visit. There are no discontinued medications. [facility] follow up appointment(s):  
Future Appointments Date Time Provider Kristine Kirk 5/4/2020  8:30 AM Karolyn Bear MD [facility]  
6/1/2020  8:30 AM Karolyn Bear  [facility] follow up appointment(s): none Goals Addressed This Visit's Progress  Prevent complications post hospitalization. 1. CTN will monitor X 4 weeks 2. Ensure provider appt is scheduled within 7 days post-discharge PCP appointment  2/24/2020 1:00 PM 
3. Confirm patient attended post-discharge provider apt 
2/24/20 patient attended PCP appointment. Patient had been readmitted to the hospital and was seen for that admission also. 4. Complete post-visit call to confirm attendance and update care needs 5. Review/educate common or potential \"red flags\" of condition worsening 
 weight gain of 2 pounds in 24 hours 3 pounds in 3 days or 4- 5 pounds in one week. Severe shortness of breath Swelling of lower extremeties Swelling in abdomen Loss of appetite Lightheaded or dizziness 2/24/20 daughter in law voices understanding as to when to take the patient to the hospital.   
6. Evaluate adherence to medications and priority barriers to resolve 7. Instruct on adherence to medications as ordered and assess for therapeutic response and side-effects 8. Discuss and evaluate ADL performance. Provide recommendations on energy conservation, particularly related to transition home from an inpatient admission. Jeannette Trujillo BSN, RN, [employer] Care Transitions Nurse [phone]

## 2020-03-03 NOTE — TELEPHONE ENCOUNTER
Diana Hoover from [facility] called asking for an order for nursing evaluation. p [phone] f [phone]. They don't have that listed as a service on  home care orders. Daughter is requesting someone to eval her mother. Last ov 2/24/2020.

## 2020-03-03 NOTE — TELEPHONE ENCOUNTER
Called twice, reached pt each time. She said she's fine and her daughter in law is out. \"you just missed her\" each time.

## 2020-03-05 NOTE — TELEPHONE ENCOUNTER
Patient's daughter in-law called requesting an order for [address]. Patient had one ordered for her previously from a doctor when she was in the hospital but now needs one from [address] [facility] [address]

## 2020-03-06 NOTE — TELEPHONE ENCOUNTER
Patient daughter called very angry that no one has called her back and that the home health order has not been placed . I read Dr Citlali Holley message about what services does she need . The daughter in law said I not understand what you are asking me . I asked if there were services she feels patient need ie. Help with bathing , medications , she then said to me what good are you people !! I have called someone from home health has called to request an order, I just want someone to come out and evaluate her. The hospital ordered this after she was discharged . the Physical therapist came out and said she was fine and she is not fine  . I apologized to her and said per patients' chart dorian had to tried to call the number in message twice and patient told her both times by patient  that she has just missed her daughter in law , she said why are you calling the home you have my number you should be calling me . I I told her that I am only the covering nurse and have not known of this issue until now and apologized for the delay   . I told her I would pass along the message and it may not be until Monday since Dr Citlali Holley was with patient but I would do my best to get this to [facility] asap  . The daughter in law said  I don't understanding what is wrong with health care .you people don't care and she hopes that someone comes out before the  patient is dead and told me to have a nice weekend and we disconnected the call .

## 2020-03-11 NOTE — PROGRESS NOTES
Hospital Discharge Follow-Up Date/Time:  3/11/2020 3:01 PM 
 
Patient was admitted to [facility] on 20 and discharged on 20 for GI bleed. The physician discharge summary was available at the time of outreach. Patient was contacted within 1 business days of discharge. Top Challenges reviewed with the provider Patient's family reports : 
? Patient has a large amount of fluid in hands and feet. Daughter in law states that they look like they are going to pop. Patient is currently taking Lasix 20 mg daily. Family reports very poor out put in spite of lasix and encouraging patient to drink through out the day. - CTN called [facility] to see if there is anyway they can send some one out to assess the patient today. ? Patient is winded can not elevat legs for long patient becomes more short of breath. CTN suggested to Lorenza Dempsey daughter in law that she should take the patient to the emergancy room to be evaluated. Daughter in law stated that patient would not go. ? Still has not started home health and when the patient son called [facility] they said they did not have an order. CTN called [facility] Intake department and verified fax number. ? Faxed order to [facility] and called to verify receipt. CTN spoke with Jena and they will resume care in approximately 48 hours. ? Call placed to Daughter  In law and made her aware of above. Patient's family Denies : 
? Bleeding at this time Advance Care Planning:  
Does patient have an Advance Directive:  not on file Was this a readmission? yes Patient stated reason for the readmission: bloody stools and UTI Care Transition Nurse (CTN) contacted the family by telephone to perform post hospital discharge assessment. Verified name and  with family as identifiers. Provided introduction to self, and explanation of the CTN role. Family given an opportunity to ask questions and does not have any further questions or concerns at this time. The family agrees to contact the PCP office for questions related to their healthcare. CTN provided contact information for future reference. Patients top risk factors for readmission:  functional physical ability, medical condition Home Health orders at discharge: none Durable Medical Equipment ordered at discharge: none Medication(s):  
No new mewdications since discharge Referral to Pharm D needed: no  
 
Current Outpatient Medications Medication Sig  cephALEXin (KEFLEX) 500 mg capsule Take 500 mg by mouth four (4) times daily.  ferrous sulfate 325 mg (65 mg iron) tablet Take 1 Tab by mouth two (2) times a day.  furosemide (LASIX) 20 mg tablet Take 1 Tab by mouth daily as needed (edema).  levothyroxine (SYNTHROID) 112 mcg tablet Take 1 Tab by mouth Daily (before breakfast). Brand only  potassium chloride (K-DUR, KLOR-CON) 20 mEq tablet Take 1 Tab by mouth daily as needed (with lasix).  cholecalciferol (VITAMIN D3) 1,000 unit tablet Take  by mouth daily. No current facility-administered medications for this visit. There are no discontinued medications. [facility] follow up appointment(s):  
Future Appointments Date Time Provider Kristine Kirk 5/4/2020  8:30 AM Tamiko Bear MD [facility]  
6/1/2020  8:30 AM Tamiko Bear MD [facility] follow up appointment(s): none CTN forwarded pictures to PCP via Email Unable to attach them to the chart Call placed to family asking them to send the pictures to the doctor through my chart so that they are visible in the chart but I will also forward the e-mail I received from daughter in law to PCP.    
  Call placed to [facility] home health secure voice mail message left requesting that they have a nurse go out to assess the patient as soon as possible concidering the patient chooses not to go back to the hospital. 
Received call back from Della Infante at [facility] they will put the patient on the on call schedule for a nurse for tomorrow. Patricio Chester called back and stated she was not able to get the pictures in to My chart. Call placed to Dr. Milka Fuentes office and spoke to a nurse. Received order from Dr. Real July to have the patient increase to 40 mg lasix daily for 3 days then go back to 20 Mg Lasix. Also if the patient gets worse then she needs to go to the ED. Call placed to Patricio Briggs and made her aware of the above orders. CTN stressed the need to take patient to the ED if she is not getting better or gets worse. CTN also asked that the family contact the PCP on Friday to give her an update on patient's condition. Daughter in law voiced understanding. Dustin GARCIAN, RN, [employer] Care Transitions Nurse [phone]

## 2020-03-11 NOTE — TELEPHONE ENCOUNTER
Pt's daughter in law called NN requesting pt's hand and feet swelling to be addressed. Dr Levert Opitz reviewed photos, notes. Pt is instructed to double her lasix up to three days and return to 20 mg daily after that time. Monitor and call the office if that does not help. Pt has known diastolic heart failure which can lead to increased swelling at times. NN aware. She will contact the daughter in law.

## 2020-03-13 NOTE — TELEPHONE ENCOUNTER
[facility] nurse called from pt's residence late afternoon 3/12/20, calling with initial assessment. She notes some hand and feet swelling. It has been 2 days on the increased lasix. The daughter in law is not noticing a difference yet per the nurse. The DIL needs to continue the same dosing schedule and contact the pcp if no changes or return to hospital if condition worsens. The [facility] nurse notes when asked if the pt is elevating legs , no and if the patient is ambulating at all, no. The patient is just sitting in her chair. The nurse did provide patient education to the family concerning this. The nurse said she would see the patient three times next week.  The nurse said the physical therapist would be out today to assess for PT.

## 2020-03-13 NOTE — TELEPHONE ENCOUNTER
Daughter in law called stating this is the third day of increased lasix and the swelling hasn't changed. She has moved the potty chair next to her chair and has her drinking fluids. The pt is sedentary. No pain. Please advise.

## 2020-03-16 NOTE — TELEPHONE ENCOUNTER
Family will call back. They couldn't commit to an appt at this time. She is very difficult to transport. They will see [facility] nurse three times this week.

## 2020-03-16 NOTE — TELEPHONE ENCOUNTER
Pt's daughter in law called and put the [facility] nurse on the phone. Pt continued doubled lasix over the weekend. Her hand and foot swelling has gone down quite a bit per nurse. She was given verbal order to decrease back to regular dose lasix. Pt still needs seen by pcp. Pcp gave verbal order of BMP and pro-BNP to nurse Janice Lira. Janice Lira RN read back the order, will call back with fax number to send to [facility]. Janice Lira will draw labs today.

## 2020-03-17 NOTE — TELEPHONE ENCOUNTER
Spoke to daughter in law Kasi. She will  new med 40 mg tabs and give daily. Called [facility] nurse . Relayed result and verbal order for new bmp as directed by Dr Ravi Camacho. Janice Lira RN will call back with fax number for us to send over order.

## 2020-03-17 NOTE — TELEPHONE ENCOUNTER
Tell daughter in law that labs show CHF exacerbation. She should remain on lasix 40mg daily for now. Kidney function is stable. I need home health to repeat bmp in 1 week.

## 2020-03-24 NOTE — TELEPHONE ENCOUNTER
Spoke to daughter in law Kasi regarding dr recommendation that pt return to 20 mg lasix due to dehydration. She verbalized understanding, states pt only has one pill left. Please send refill to local pharmacy. Last written 2/4/2020.